# Patient Record
Sex: MALE | Race: BLACK OR AFRICAN AMERICAN | NOT HISPANIC OR LATINO | Employment: FULL TIME | URBAN - METROPOLITAN AREA
[De-identification: names, ages, dates, MRNs, and addresses within clinical notes are randomized per-mention and may not be internally consistent; named-entity substitution may affect disease eponyms.]

---

## 2017-01-24 ENCOUNTER — HOSPITAL ENCOUNTER (OUTPATIENT)
Dept: CARDIOLOGY | Facility: HOSPITAL | Age: 57
Discharge: HOME | End: 2017-01-24
Attending: ORTHOPAEDIC SURGERY | Admitting: ORTHOPAEDIC SURGERY

## 2017-01-24 ENCOUNTER — TRANSCRIBE ORDERS (OUTPATIENT)
Dept: ADMINISTRATIVE | Facility: HOSPITAL | Age: 57
End: 2017-01-24

## 2017-01-24 ENCOUNTER — LAB (OUTPATIENT)
Dept: LAB | Facility: HOSPITAL | Age: 57
End: 2017-01-24
Attending: ORTHOPAEDIC SURGERY

## 2017-01-24 ENCOUNTER — HOSPITAL ENCOUNTER (OUTPATIENT)
Dept: GENERAL RADIOLOGY | Facility: HOSPITAL | Age: 57
Discharge: HOME | End: 2017-01-24
Attending: ORTHOPAEDIC SURGERY | Admitting: ORTHOPAEDIC SURGERY

## 2017-01-24 DIAGNOSIS — Z01.818 PREOP TESTING: ICD-10-CM

## 2017-01-24 DIAGNOSIS — M25.511 RIGHT SHOULDER PAIN, UNSPECIFIED CHRONICITY: ICD-10-CM

## 2017-01-24 DIAGNOSIS — M25.511 RIGHT SHOULDER PAIN, UNSPECIFIED CHRONICITY: Primary | ICD-10-CM

## 2017-01-24 LAB
ANION GAP SERPL CALCULATED.3IONS-SCNC: 11.8 MMOL/L
BUN BLD-MCNC: 13 MG/DL (ref 6–20)
BUN/CREAT SERPL: 11.9 (ref 7–25)
CALCIUM SPEC-SCNC: 9.9 MG/DL (ref 8.6–10.5)
CHLORIDE SERPL-SCNC: 100 MMOL/L (ref 98–107)
CO2 SERPL-SCNC: 29.2 MMOL/L (ref 22–29)
CREAT BLD-MCNC: 1.09 MG/DL (ref 0.76–1.27)
DEPRECATED RDW RBC AUTO: 44.1 FL (ref 37–54)
ERYTHROCYTE [DISTWIDTH] IN BLOOD BY AUTOMATED COUNT: 12.9 % (ref 11.5–14.5)
GFR SERPL CREATININE-BSD FRML MDRD: 85 ML/MIN/1.73
GLUCOSE BLD-MCNC: 108 MG/DL (ref 65–99)
HCT VFR BLD AUTO: 47.9 % (ref 40.4–52.2)
HGB BLD-MCNC: 16.2 G/DL (ref 13.7–17.6)
MCH RBC QN AUTO: 31.8 PG (ref 27–32.7)
MCHC RBC AUTO-ENTMCNC: 33.8 G/DL (ref 32.6–36.4)
MCV RBC AUTO: 93.9 FL (ref 79.8–96.2)
PLATELET # BLD AUTO: 139 10*3/MM3 (ref 140–500)
PMV BLD AUTO: 12.6 FL (ref 6–12)
POTASSIUM BLD-SCNC: 4.2 MMOL/L (ref 3.5–5.2)
RBC # BLD AUTO: 5.1 10*6/MM3 (ref 4.6–6)
SODIUM BLD-SCNC: 141 MMOL/L (ref 136–145)
WBC NRBC COR # BLD: 5.9 10*3/MM3 (ref 4.5–10.7)

## 2017-01-24 PROCEDURE — 80048 BASIC METABOLIC PNL TOTAL CA: CPT

## 2017-01-24 PROCEDURE — 85027 COMPLETE CBC AUTOMATED: CPT

## 2017-01-24 PROCEDURE — 36415 COLL VENOUS BLD VENIPUNCTURE: CPT

## 2017-01-24 PROCEDURE — 93010 ELECTROCARDIOGRAM REPORT: CPT | Performed by: INTERNAL MEDICINE

## 2017-01-24 PROCEDURE — 93005 ELECTROCARDIOGRAM TRACING: CPT

## 2017-02-17 ENCOUNTER — TREATMENT (OUTPATIENT)
Dept: PHYSICAL THERAPY | Facility: CLINIC | Age: 57
End: 2017-02-17

## 2017-02-17 DIAGNOSIS — M25.511 ACUTE PAIN OF RIGHT SHOULDER: Primary | ICD-10-CM

## 2017-02-17 DIAGNOSIS — Z98.890 S/P ARTHROSCOPY OF SHOULDER: ICD-10-CM

## 2017-02-17 PROCEDURE — 97140 MANUAL THERAPY 1/> REGIONS: CPT | Performed by: PHYSICAL THERAPIST

## 2017-02-17 PROCEDURE — 97530 THERAPEUTIC ACTIVITIES: CPT | Performed by: PHYSICAL THERAPIST

## 2017-02-17 PROCEDURE — 97110 THERAPEUTIC EXERCISES: CPT | Performed by: PHYSICAL THERAPIST

## 2017-02-17 PROCEDURE — 97001 PR PHYS THERAPY EVALUATION: CPT | Performed by: PHYSICAL THERAPIST

## 2017-02-17 PROCEDURE — 97014 ELECTRIC STIMULATION THERAPY: CPT | Performed by: PHYSICAL THERAPIST

## 2017-02-17 NOTE — PROGRESS NOTES
"Physical Therapy Initial Evaluation and Plan of Care    TIME IN 1358 TIME OUT 1529  Patient: Pop Franco   : 1960  Diagnosis/ICD-10 Code:  Acute pain of right shoulder [M25.511]  Referring practitioner: KESHA Dobbins    Subjective Evaluation    History of Present Illness  Date of onset: 2016  Date of surgery: 2017  Mechanism of injury: S/p (R) shoulder SLAP (type 2) repair and acromioplasty.  Sling x 2 weeks.  Original DIANA: stepping down out of truck, missed bottom step.  States he had gone back to work one day () and re-aggravated (R) shoulder performing driving activities.  Returned to Dr. Clayton who scheduled patient for surgery.    Quality of life: excellent    Pain  Current pain ratin  At best pain ratin  At worst pain ratin  Location: generalized (R) anterior and superior shoulder  Quality: sharp (\"strong\")  Relieving factors: rest and support (has not tried ice after surgery; is not currently taking any pain medication)  Aggravating factors: movement and overhead activity (sleeping/waking early due to shoulder pain)    Social Support  Lives in: apartment (2nd floor apartment)  Lives with: alone    Hand dominance: right    Diagnostic Tests  MRI studies: normal (6mm (R) SS tear; during surgery determined to be Type 2 SLAP tear)    Treatments  Previous treatment: physical therapy and medication  Patient Goals  Patient goals for therapy: decreased pain, increased motion, increased strength, independence with ADLs/IADLs and return to work  Patient goal: \"get back to work\"           Objective     Static Posture     Shoulders  Depressed and rounded.    Scapulae  Left protracted and right protracted.    Tenderness     Right Shoulder  Tenderness in the acromion, biceps tendon (proximal), bicipital groove and clavicle.     Additional Tenderness Details  Mod. (+) TTP (R) anterior shoulder along GH joint line  Min. (+) TTP (R) lateral upper arm    Active Range of " "Motion   Left Shoulder   Flexion: 157 degrees   Extension: 55 degrees   Abduction: 172 degrees   External rotation 90°: 98 degrees   Internal rotation 0°: 37 degrees     Left Elbow   Flexion: 143 degrees   Extension: 0 degrees     Right Elbow   Flexion: 132 degrees   Extension: 0 (\"a little pain\") degrees with pain    Additional Active Range of Motion Details  (R) shoulder  AROM not yet assessed due to post-op status.    Passive Range of Motion     Right Shoulder   Flexion: 116 degrees with pain  Abduction: Right shoulder passive abduction: Not tested due to recent post-op status.   External rotation 0°: 50 (\"a little sore\") degrees     Strength/Myotome Testing     Left Shoulder   Normal muscle strength    Additional Strength Details  (R) shoulder and elbow strength not formally assessed due to recent post-op status         Assessment & Plan     Assessment  Impairments: abnormal or restricted ROM, activity intolerance, impaired physical strength, lacks appropriate home exercise program and pain with function  Assessment details: SHORT TERM GOALS:     4 weeks  1. Pt I w/ initial HEP  2. Pt to demonstrate PROM of the right shoulder to WFL to improve ability to perform ADL's  3. Pt to demonstrate ability to perform 30 minutes continuous activity in the clinic without increase in (R) shoulder pain   4. Tolerate 10 reps each of 5-way shoulder isometrics in neutral without increased s/s  5. Report ability to sleep 4-5 hours uninterrupted by pain    LONG TERM GOALS:   8 weeks  1. Pt to demonstrate AROM of the right shoulder to WNL to allow ability to perform all necessary functional activities  2. Pt to demonstrate ability to lift 40# floor-->waist-->shoulder and 15-20# OH without increase in pain  3. Pt to report normalized driving ability, painfree  4. Pt to tolerate 60 minutes continuous activity in the clinic without increase in pain   5. Report 75-80% return to normal functional ability  6. Demo readiness to RTW " "without restrictions    Prognosis: good    Goals  \"get back to work\"    Plan  Therapy options: will be seen for skilled physical therapy services  Planned modality interventions: cryotherapy, electrical stimulation/Sl[place] stimulation, ultrasound and iontophoresis  Planned therapy interventions: fine motor coordination training, flexibility, functional ROM exercises, home exercise program, joint mobilization, manual therapy, soft tissue mobilization, strengthening, stretching and therapeutic activities  Frequency: 3x week  Duration in weeks: 8        Manual Therapy:    15     mins  97269;  Therapeutic Exercise:    16     mins  46135;     Neuromuscular Rachid:        mins  53176;    Therapeutic Activity:     12     mins  00054;     Gait Training:           mins  32801;     Ultrasound:          mins  82219;    Electrical Stimulation:    15     mins  49928 ( );  Dry Needling          mins self-pay    Timed Treatment:   43   mins   Total Treatment:     91   mins    PT SIGNATURE: Krystle Aguilar PT, DPT   DATE TREATMENT INITIATED: 2/17/2017    Initial Certification  Certification Period: 5/18/2017  I certify that the therapy services are furnished while this patient is under my care.  The services outlined above are required by this patient, and will be reviewed every 90 days.     PHYSICIAN: Joshua Seals, APRN      DATE:     Please sign and return via fax to [phone].. Thank you, [facility].        "

## 2017-02-20 ENCOUNTER — TREATMENT (OUTPATIENT)
Dept: PHYSICAL THERAPY | Facility: CLINIC | Age: 57
End: 2017-02-20

## 2017-02-20 DIAGNOSIS — Z98.890 S/P ARTHROSCOPY OF SHOULDER: ICD-10-CM

## 2017-02-20 DIAGNOSIS — M25.511 ACUTE PAIN OF RIGHT SHOULDER: Primary | ICD-10-CM

## 2017-02-20 DIAGNOSIS — Z98.890 STATUS POST LABRAL REPAIR OF SHOULDER: ICD-10-CM

## 2017-02-20 PROCEDURE — 97530 THERAPEUTIC ACTIVITIES: CPT | Performed by: PHYSICAL THERAPIST

## 2017-02-20 PROCEDURE — 97140 MANUAL THERAPY 1/> REGIONS: CPT | Performed by: PHYSICAL THERAPIST

## 2017-02-20 PROCEDURE — 97110 THERAPEUTIC EXERCISES: CPT | Performed by: PHYSICAL THERAPIST

## 2017-02-20 PROCEDURE — 97014 ELECTRIC STIMULATION THERAPY: CPT | Performed by: PHYSICAL THERAPIST

## 2017-02-20 NOTE — PROGRESS NOTES
"Physical Therapy Daily Progress Note    Time In 1258  Time Out 1400    Pop Franco reports: \"I guess my shoulder is about the same.\"  When questioned, patient responds that he has been doing his stretches/exercises over the weekend.     Subjective Evaluation    Pain  Current pain ratin (with stretching/exercise; 5/10 at rest)           Objective   See Exercise, Manual, and Modality Logs for complete treatment.   *Added Swiss ball flexion and abduction on low table and scapular retractions/adduction.    Assessment & Plan     Assessment  Assessment details: Patient had reported at first visit that (R) shoulder was painful going into stretches and coming out of them (eccentric), but this date pain is primarily once position of stretch is reached.  He is compliant with all activities during PT as asked but does report that most are painful.  He tolerates manual activities well but has moderate difficulty fully relaxing (R) UE/moderate muscle guarding persists. He expresses positive response to electrical stimulation and ice modalities.          Progress per Plan of Care with emphasis on ROM activities.         Manual Therapy:    15     mins  83269;  Therapeutic Exercise:    21     mins  40140;     Neuromuscular Rachid:        mins  41279;    Therapeutic Activity:     11     mins  64207;     Gait Training:           mins  72653;     Ultrasound:          mins  46468;    Electrical Stimulation:    15     mins  80484 ( );  Dry Needling          mins self-pay    Timed Treatment:   47   mins   Total Treatment:     62   mins    Krystle Aguilar PT, DPT  Physical Therapist  [place] License # [id number]    "

## 2017-02-21 ENCOUNTER — TREATMENT (OUTPATIENT)
Dept: PHYSICAL THERAPY | Facility: CLINIC | Age: 57
End: 2017-02-21

## 2017-02-21 DIAGNOSIS — M25.511 ACUTE PAIN OF RIGHT SHOULDER: Primary | ICD-10-CM

## 2017-02-21 DIAGNOSIS — Z98.890 STATUS POST LABRAL REPAIR OF SHOULDER: ICD-10-CM

## 2017-02-21 DIAGNOSIS — Z98.890 S/P ARTHROSCOPY OF SHOULDER: ICD-10-CM

## 2017-02-21 PROCEDURE — 97014 ELECTRIC STIMULATION THERAPY: CPT | Performed by: PHYSICAL THERAPIST

## 2017-02-21 PROCEDURE — 97140 MANUAL THERAPY 1/> REGIONS: CPT | Performed by: PHYSICAL THERAPIST

## 2017-02-21 PROCEDURE — 97110 THERAPEUTIC EXERCISES: CPT | Performed by: PHYSICAL THERAPIST

## 2017-02-21 NOTE — PROGRESS NOTES
"Physical Therapy Daily Progress Note    Time In 1336  Time Out 1441    Pop Franco reports: \"My shoulder is about the same I guess.\" When questioned, patient responds that he is not taking any pain medication and that he does not want to have to take anything.    Subjective Evaluation    Pain  Current pain ratin  At worst pain ratin (with stretching/exercise/activity)           Objective   See Exercise, Manual, and Modality Logs for complete treatment.   *Added 3-way shoulder submaximal isometrics (flexion, abduction, extension)    Assessment & Plan     Assessment  Assessment details: Slightly improved (R) shoulder PROM arcs available this date compared to previous session, and subjective c/o decrease as patient is able to increase relaxation during manual efforts. Patient is able to complete all activities with minimal cueing to correct and/or improve technique.  Patient states he is tolerating all therapy without taking any pain medication (prescription or OTC).        Progress per Plan of Care. Reassess (R) shoulder PROM.         Manual Therapy:    15     mins  95433;  Therapeutic Exercise:    35     mins  61652;     Neuromuscular Rachid:        mins  84710;    Therapeutic Activity:          mins  02575;     Gait Training:           mins  64804;     Ultrasound:          mins  56967;    Electrical Stimulation:    15     mins  11462 ( );  Dry Needling          mins self-pay    Timed Treatment:   50   mins   Total Treatment:     65   mins    Krystle Aguilar PT, DPT  Physical Therapist  [place] License # [id number]    "

## 2017-02-22 ENCOUNTER — TREATMENT (OUTPATIENT)
Dept: PHYSICAL THERAPY | Facility: CLINIC | Age: 57
End: 2017-02-22

## 2017-02-22 DIAGNOSIS — Z98.890 S/P ARTHROSCOPY OF SHOULDER: ICD-10-CM

## 2017-02-22 DIAGNOSIS — M25.511 ACUTE PAIN OF RIGHT SHOULDER: Primary | ICD-10-CM

## 2017-02-22 DIAGNOSIS — Z98.890 STATUS POST LABRAL REPAIR OF SHOULDER: ICD-10-CM

## 2017-02-22 PROCEDURE — 97140 MANUAL THERAPY 1/> REGIONS: CPT | Performed by: PHYSICAL THERAPIST

## 2017-02-22 PROCEDURE — 97110 THERAPEUTIC EXERCISES: CPT | Performed by: PHYSICAL THERAPIST

## 2017-02-22 PROCEDURE — 97014 ELECTRIC STIMULATION THERAPY: CPT | Performed by: PHYSICAL THERAPIST

## 2017-02-22 NOTE — PROGRESS NOTES
"Physical Therapy Daily Progress Note    Time In 1105  Time Out 1214    Pop Franco reports: \"My shoulder might feel just a little bit better. The shoulder pain makes it harder to fall asleep but it only woke me up once last night.\"    Subjective     Objective     Passive Range of Motion     Right Shoulder   Flexion: 132 degrees with pain  External rotation 0°: 58 degrees with pain     See Exercise, Manual, and Modality Logs for complete treatment.       Assessment & Plan     Assessment  Assessment details: Patient demonstrates improved (R) shoulder PROM in planes of flexion and ER (from neutral abduction), both pain-limited.  Patient requires increased time to complete all activities as he performs them slowly and cautiously, but completes all activities as asked.  Encouraged more frequent use of ice at home and patient verbalized understanding.        Progress per Plan of Care         Manual Therapy:    15     mins  32231;  Therapeutic Exercise:    33     mins  88310;     Neuromuscular Rachid:        mins  31622;    Therapeutic Activity:          mins  11494;     Gait Training:           mins  23567;     Ultrasound:          mins  92543;    Electrical Stimulation:    15     mins  83891 ( );  Dry Needling          mins self-pay    Timed Treatment:   48   mins   Total Treatment:     69   mins    Krystle Aguilar PT, DPT  Physical Therapist  [place] License # [id number]    "

## 2017-02-27 ENCOUNTER — TREATMENT (OUTPATIENT)
Dept: PHYSICAL THERAPY | Facility: CLINIC | Age: 57
End: 2017-02-27

## 2017-02-27 DIAGNOSIS — Z98.890 STATUS POST LABRAL REPAIR OF SHOULDER: ICD-10-CM

## 2017-02-27 DIAGNOSIS — Z98.890 S/P ARTHROSCOPY OF SHOULDER: ICD-10-CM

## 2017-02-27 DIAGNOSIS — M25.511 ACUTE PAIN OF RIGHT SHOULDER: Primary | ICD-10-CM

## 2017-02-27 PROCEDURE — 97014 ELECTRIC STIMULATION THERAPY: CPT | Performed by: PHYSICAL THERAPIST

## 2017-02-27 PROCEDURE — 97110 THERAPEUTIC EXERCISES: CPT | Performed by: PHYSICAL THERAPIST

## 2017-02-27 PROCEDURE — 97140 MANUAL THERAPY 1/> REGIONS: CPT | Performed by: PHYSICAL THERAPIST

## 2017-02-27 NOTE — PROGRESS NOTES
"Physical Therapy Daily Progress Note    Time In 1304  Time Out 1433    Pop Franco reports: \"My shoulder is feeling a little bit better today.\"    Subjective     Objective   See Exercise, Manual, and Modality Logs for complete treatment.   *Increased repetitions of submax isometrics.    Assessment & Plan     Assessment  Assessment details: Patient requires significantly increased time to perform/complete all activities, and at times appears to nearly be falling asleep during exercise/stretching performance.  Patient expresses increased pain with submaximal isometric activity this date compared to prior.  He tolerates PROM/manual therapy efforts fairly well this date after initial several repetitions which are moderately limited by pain.         Progress per Plan of Care         Manual Therapy:    15     mins  33711;  Therapeutic Exercise:    33     mins  58584;     Neuromuscular Rachid:        mins  87740;    Therapeutic Activity:          mins  63973;     Gait Training:           mins  71681;     Ultrasound:          mins  68962;    Electrical Stimulation:    15     mins  02341 ( );  Dry Needling          mins self-pay    Timed Treatment:   48   mins   Total Treatment:     89   mins    Krystle Aguilar PT, DPT  Physical Therapist  [place] License # [id number]    "

## 2017-03-01 ENCOUNTER — TREATMENT (OUTPATIENT)
Dept: PHYSICAL THERAPY | Facility: CLINIC | Age: 57
End: 2017-03-01

## 2017-03-01 DIAGNOSIS — Z98.890 S/P ARTHROSCOPY OF SHOULDER: ICD-10-CM

## 2017-03-01 DIAGNOSIS — M25.511 ACUTE PAIN OF RIGHT SHOULDER: Primary | ICD-10-CM

## 2017-03-01 DIAGNOSIS — Z98.890 STATUS POST LABRAL REPAIR OF SHOULDER: ICD-10-CM

## 2017-03-01 PROCEDURE — 97110 THERAPEUTIC EXERCISES: CPT | Performed by: PHYSICAL THERAPIST

## 2017-03-01 PROCEDURE — 97014 ELECTRIC STIMULATION THERAPY: CPT | Performed by: PHYSICAL THERAPIST

## 2017-03-01 PROCEDURE — 97140 MANUAL THERAPY 1/> REGIONS: CPT | Performed by: PHYSICAL THERAPIST

## 2017-03-01 NOTE — PROGRESS NOTES
"Physical Therapy Daily Progress Note    Time In 1107  Time Out 1211    Pop Franco reports: \"I'm doing all right I guess. Today after about 10 repetitions of the [shoulderblade squeezes] I started having some pain at the front of my shoulder when I came back to the start position. Those usually aren't painful. I slept pretty good last night -- my shoulder only woke me up a couple times; once because I rolled over onto my shoulder.\"     Subjective     Objective   See Exercise, Manual, and Modality Logs for complete treatment.   *Increased repetitions of most stretching/AAROM exercises.    Assessment & Plan     Assessment  Assessment details: Patient is compliant with PT interventions and performs all activities as asked, though with significantly increased time to perform.  PROM/manual efforts are pain-limited in planes of flexion and external rotation, though patient does not appear to experience the sharpness with initial repetitions.  Patient verbalizes compliance with HEP.        Progress per Plan of Care - ROM and gentle strengthening activities.  Consider addition of isometric IR and ER.  Reassess (R) shoulder PROM.         Manual Therapy:    15     mins  51984;  Therapeutic Exercise:    34     mins  31918;     Neuromuscular Rachid:        mins  90476;    Therapeutic Activity:          mins  77301;     Gait Training:           mins  58614;     Ultrasound:          mins  16870;    Electrical Stimulation:    15     mins  69282 ( );  Dry Needling          mins self-pay    Timed Treatment:   49   mins   Total Treatment:     64   mins    Krystle Aguilar PT, DPT  Physical Therapist  [place] License # [id number]    "

## 2017-03-03 ENCOUNTER — TREATMENT (OUTPATIENT)
Dept: PHYSICAL THERAPY | Facility: CLINIC | Age: 57
End: 2017-03-03

## 2017-03-03 DIAGNOSIS — Z98.890 STATUS POST LABRAL REPAIR OF SHOULDER: ICD-10-CM

## 2017-03-03 DIAGNOSIS — Z98.890 S/P ARTHROSCOPY OF SHOULDER: ICD-10-CM

## 2017-03-03 DIAGNOSIS — M25.511 ACUTE PAIN OF RIGHT SHOULDER: Primary | ICD-10-CM

## 2017-03-03 PROCEDURE — 97140 MANUAL THERAPY 1/> REGIONS: CPT | Performed by: PHYSICAL THERAPIST

## 2017-03-03 PROCEDURE — 97110 THERAPEUTIC EXERCISES: CPT | Performed by: PHYSICAL THERAPIST

## 2017-03-03 PROCEDURE — 97014 ELECTRIC STIMULATION THERAPY: CPT | Performed by: PHYSICAL THERAPIST

## 2017-03-03 NOTE — PROGRESS NOTES
"Physical Therapy Daily Progress Note    Time In 1104  Time Out 1211    Pop Franco reports: \"My shoulder wasn't really bothering me until I came in here and started stretching it.  I actually slept good last night; it was the first night since surgery I slept all night without my shoulder waking me.\"    Subjective     Objective     Passive Range of Motion     Right Shoulder   Flexion: 145 degrees   External rotation 45°: 60 (from 30 deg abduction) degrees with pain     See Exercise, Manual, and Modality Logs for complete treatment.   *Added isometric shoulder IR/ER and bent over row    Assessment & Plan     Assessment  Assessment details: Patient demonstrates improved (R) shoulder PROM in planes of flexion and ER (from 30 deg abduction), though both are pain-limited.  He is able to initiate bent over row though cannot tolerate (R) upper arm extending beyond trunk, so patient was cued for performance within painfree ROM.  He reports first night of sleep uninterrupted by pain since surgery. He also expresses motivation to be released to return to work, but was cautioned that he does not demonstrate sufficient strength as yet and risk of re-injury would be high.         Progress per Plan of Care       Manual Therapy:    15     mins  24296;  Therapeutic Exercise:    37     mins  39479;     Neuromuscular Rachid:        mins  63246;    Therapeutic Activity:          mins  66028;     Gait Training:           mins  82163;     Ultrasound:          mins  77277;    Electrical Stimulation:    15     mins  40545 ( );  Dry Needling          mins self-pay    Timed Treatment:   52   mins   Total Treatment:     67   mins    Krystle Aguilar PT, DPT  Physical Therapist  [place] License # [id number]    "

## 2017-03-08 ENCOUNTER — TREATMENT (OUTPATIENT)
Dept: PHYSICAL THERAPY | Facility: CLINIC | Age: 57
End: 2017-03-08

## 2017-03-08 DIAGNOSIS — M25.511 ACUTE PAIN OF RIGHT SHOULDER: Primary | ICD-10-CM

## 2017-03-08 DIAGNOSIS — Z98.890 STATUS POST LABRAL REPAIR OF SHOULDER: ICD-10-CM

## 2017-03-08 DIAGNOSIS — Z98.890 S/P ARTHROSCOPY OF SHOULDER: ICD-10-CM

## 2017-03-08 PROCEDURE — 97110 THERAPEUTIC EXERCISES: CPT | Performed by: PHYSICAL THERAPIST

## 2017-03-08 PROCEDURE — 97140 MANUAL THERAPY 1/> REGIONS: CPT | Performed by: PHYSICAL THERAPIST

## 2017-03-08 PROCEDURE — 97014 ELECTRIC STIMULATION THERAPY: CPT | Performed by: PHYSICAL THERAPIST

## 2017-03-08 NOTE — PROGRESS NOTES
"Physical Therapy Daily Progress Note    Time In 1103  Time Out 1204    Pop Franco reports: \"I am hurting more than usual today.  I didn't think it would really bother me so I took a day trip to [place] [left Monday morning, returned Tuesday around noon] and I didn't do anything strenuous but by the time I got back home I was really hurting.\"    Subjective Evaluation    Pain  Current pain ratin (R) shoulder at rest           Objective   See Exercise, Manual, and Modality Logs for complete treatment.   *Progressed scapular retractions to resisted red band saws  *Added resisted shoulder extension vs band     Assessment & Plan     Assessment  Assessment details: Patient is able to initiate resisted band activities this date, but generally c/o increased s/s with all exercises and stretching performed this date.  Pain at rest is reportedly higher this date than in recent sessions.  PROM manual efforts are limited by pain prior to capsular tightness or restriction, but available ROM arcs persist with steady improvement.        Progress strengthening /stabilization /functional activity. Update POC.         Manual Therapy:    12     mins  35650;  Therapeutic Exercise:    34     mins  00739;     Neuromuscular Rachid:        mins  75776;    Therapeutic Activity:          mins  58973;     Gait Training:           mins  98910;     Ultrasound:          mins  11552;    Electrical Stimulation:    15     mins  84166 ( );  Dry Needling          mins self-pay    Timed Treatment:   46   mins   Total Treatment:     61   mins    Krystle Aguilar PT, DPT  Physical Therapist  [place] License # [id number]    "

## 2017-03-09 ENCOUNTER — OFFICE VISIT (OUTPATIENT)
Dept: PHYSICAL THERAPY | Facility: CLINIC | Age: 57
End: 2017-03-09

## 2017-03-09 DIAGNOSIS — Z98.890 STATUS POST LABRAL REPAIR OF SHOULDER: ICD-10-CM

## 2017-03-09 DIAGNOSIS — M25.511 ACUTE PAIN OF RIGHT SHOULDER: Primary | ICD-10-CM

## 2017-03-09 DIAGNOSIS — M75.101 TEAR OF RIGHT ROTATOR CUFF, UNSPECIFIED TEAR EXTENT: ICD-10-CM

## 2017-03-09 DIAGNOSIS — Z98.890 S/P ARTHROSCOPY OF SHOULDER: ICD-10-CM

## 2017-03-09 PROCEDURE — 97140 MANUAL THERAPY 1/> REGIONS: CPT | Performed by: PHYSICAL THERAPIST

## 2017-03-09 PROCEDURE — 97014 ELECTRIC STIMULATION THERAPY: CPT | Performed by: PHYSICAL THERAPIST

## 2017-03-09 PROCEDURE — 97110 THERAPEUTIC EXERCISES: CPT | Performed by: PHYSICAL THERAPIST

## 2017-03-09 NOTE — PROGRESS NOTES
Physical Therapy Daily Progress Note    Time In 1100  Time Out 1235    Pop Franco reports: right shoulder a little sore today, feels he may have slept on it wrong last night.    Subjective     Objective   See Exercise, Manual, and Modality Logs for complete treatment.   Benefits from verbal/tactile cues to ensure proper posture/technique and exercise progression.    Assessment/Plan Benefits from manual techniques(PROM and stretching) to right shoulder to improve ROM and glenohumeral mobility.  Progressing well with exercise regimen but benefits from verbal/tactile cues to ensure proper technique and performance as well as understanding importance of progression to maximize strength and stability.    Progress per Plan of Care toward all goals.  Continue with manual therapy techniques and strengthening exercises.           Manual Therapy:   15     mins  76681;  Therapeutic Exercise:    35     mins  76571;     Neuromuscular Rachid:      mins  37380;    Therapeutic Activity:     5     mins  79264;     Gait Training:          mins  93597;     Ultrasound:          mins  53244;    Electrical Stimulation:   15     mins  40844 ( );      Timed Treatment:  70    mins   Total Treatment:     95  mins    Darrion Gomez, PTA    Physical Therapist Assistant [place] 117[address]

## 2017-03-10 ENCOUNTER — TREATMENT (OUTPATIENT)
Dept: PHYSICAL THERAPY | Facility: CLINIC | Age: 57
End: 2017-03-10

## 2017-03-10 DIAGNOSIS — Z98.890 STATUS POST LABRAL REPAIR OF SHOULDER: ICD-10-CM

## 2017-03-10 DIAGNOSIS — Z98.890 S/P ARTHROSCOPY OF SHOULDER: ICD-10-CM

## 2017-03-10 DIAGNOSIS — M25.511 ACUTE PAIN OF RIGHT SHOULDER: Primary | ICD-10-CM

## 2017-03-10 PROCEDURE — 97140 MANUAL THERAPY 1/> REGIONS: CPT | Performed by: PHYSICAL THERAPIST

## 2017-03-10 PROCEDURE — 97110 THERAPEUTIC EXERCISES: CPT | Performed by: PHYSICAL THERAPIST

## 2017-03-10 PROCEDURE — 97014 ELECTRIC STIMULATION THERAPY: CPT | Performed by: PHYSICAL THERAPIST

## 2017-03-10 PROCEDURE — 97530 THERAPEUTIC ACTIVITIES: CPT | Performed by: PHYSICAL THERAPIST

## 2017-03-10 NOTE — PROGRESS NOTES
"Physical Therapy Daily Progress Note    Time In 1108  Time Out 1217    Popshane Franco reports: \"Certain things are getting better, like certain exercises are getting easier.  Most nights my shoulder will still wake me up one or two times at night but the last couple of nights I have slept pretty well.  I don't really do a whole lot at home but I guess it has been feeling a little better this week. It still hurts when I reach across my body or reaching behind me to take my wallet out of my pocket. I don't have any trouble reaching up into cabinets.  It still bothers me some when I lift a bottle of detergent or basket of clothes.\"    Subjective Evaluation    Pain  Current pain rating: 3 (with light stretching/ROM activities)  At best pain ratin  At worst pain ratin  Location: (R) lateral upper arm           Objective     Tenderness     Right Shoulder  Tenderness in the acromion, biceps tendon (proximal) and subacromial bursa.     Additional Tenderness Details  Moderate diffuse (+) TTP (R) anterior, superior, and lateral shoulder region.    Active Range of Motion     Right Shoulder   Flexion: 121 (eccentric > concentric pain) degrees with pain  Extension: 49 degrees with pain  Abduction: 123 (eccentric > concentric pain) degrees with pain  External rotation 90°: 63 degreeswith pain  Internal rotation 90°: 12 degrees with pain    Passive Range of Motion     Right Shoulder   Flexion: 163 degrees with pain  Abduction: 159 (\"a little naggin g pain\") degrees with pain  External rotation 45°: 60 degrees with pain  Internal rotation 45°: 63 degrees with pain    Strength/Myotome Testing     Right Shoulder     Planes of Motion   Flexion: 4- (with pain)   Extension: 4   Abduction: 3+ (with pain)   External rotation at 45°: 4-   Internal rotation at 0°: 4     Right Elbow   Flexion: 4 (with pain)  Extension: 4    Left Wrist/Hand      (2nd hand position)     Trial 1: 86    Trial 2: 81    Trial 3: 84    Average: " 83.67    Right Wrist/Hand      (2nd hand position)     Trial 1: 99    Trial 2: 104    Trial 3: 111    Average: 104.67     See Exercise, Manual, and Modality Logs for complete treatment.       Assessment & Plan     Assessment  Impairments: abnormal or restricted ROM, activity intolerance, impaired physical strength, lacks appropriate home exercise program and pain with function  Other impairment: Patient is currently unable to lift and perform UE strength tasks required by regular job.  Assessment details: Patient has demonstrated steady, gradual improvements with PT post-operatively.  (R) shoulder PROM is improved though not yet full.  (R) shoulder AROM and strength are improving though moderately limited as well.  He has some pain with ADLs and is not yet capable of lifting/carrying tasks required by his full duty job, so he will benefit to continue skilled PT services at this time.  He is progressing toward all goals.    SHORT TERM GOALS: 2 weeks  1. Pt I w/ initial HEP - PARTIALLY MET  2. Pt to demonstrate PROM of the right shoulder to WFL to improve ability to perform ADL's - NOT MET  3. Pt to demonstrate ability to perform 30 minutes continuous activity in the clinic without increase in (R) shoulder pain - NOT MET  4. Tolerate 10 reps each of 5-way shoulder isometrics in neutral without increased s/s - PARTIALLY MET  5. Report ability to sleep 4-5 hours uninterrupted by pain - PARTIALLY MET    LONG TERM GOALS: 4 weeks  1. Pt to demonstrate AROM of the right shoulder to WNL to allow ability to perform all necessary functional activities - NOT MET  2. Pt to demonstrate ability to lift 40# floor-->waist-->shoulder and 15-20# OH without increase in pain - NOT MET  3. Pt to report normalized driving ability, painfree - NOT MET  4. Pt to tolerate 60 minutes continuous activity in the clinic without increase in pain - NOT MET  5. Report 75-80% return to normal functional ability - NOT MET  6. Demo readiness to RTW  without restrictions - NOT MET        Other - to MD 03/14/17 with letter.         Manual Therapy:    12     mins  90348;  Therapeutic Exercise:    21     mins  34903;     Neuromuscular Rachid:        mins  34116;    Therapeutic Activity:     16     mins  43077;     Gait Training:           mins  07808;     Ultrasound:          mins  24283;    Electrical Stimulation:    15     mins  90917 ( );  Dry Needling          mins self-pay    Timed Treatment:   48   mins   Total Treatment:     69   mins    Krystle Aguilar PT, DPT  Physical Therapist  [place] License # [id number]

## 2017-03-15 ENCOUNTER — TREATMENT (OUTPATIENT)
Dept: PHYSICAL THERAPY | Facility: CLINIC | Age: 57
End: 2017-03-15

## 2017-03-15 DIAGNOSIS — M25.511 ACUTE PAIN OF RIGHT SHOULDER: Primary | ICD-10-CM

## 2017-03-15 DIAGNOSIS — Z98.890 STATUS POST LABRAL REPAIR OF SHOULDER: ICD-10-CM

## 2017-03-15 DIAGNOSIS — Z98.890 S/P ARTHROSCOPY OF SHOULDER: ICD-10-CM

## 2017-03-15 PROCEDURE — 97110 THERAPEUTIC EXERCISES: CPT | Performed by: PHYSICAL THERAPIST

## 2017-03-15 PROCEDURE — 97530 THERAPEUTIC ACTIVITIES: CPT | Performed by: PHYSICAL THERAPIST

## 2017-03-15 PROCEDURE — 97014 ELECTRIC STIMULATION THERAPY: CPT | Performed by: PHYSICAL THERAPIST

## 2017-03-15 PROCEDURE — 97140 MANUAL THERAPY 1/> REGIONS: CPT | Performed by: PHYSICAL THERAPIST

## 2017-03-15 NOTE — PROGRESS NOTES
"Physical Therapy Daily Progress Note    Time In 1030  Time Out 1147    Pop Franco reports: \"My shoulder doesn't feel too good today. I rolled over onto my (R) arm this morning and it's been bothering me more than usual since then.\"     Subjective     Objective   See Exercise, Manual, and Modality Logs for complete treatment.   *Added resisted LPDs  *Added sidelying ER    Assessment & Plan     Assessment  Assessment details: Patient reports increased s/s this date which he attributes to rolling onto (R) shoulder during sleep. He performs all activities without significantly increased s/s this date, including addition of resisted lat pulls and sidelying ER.  He verbalizes compliance with HEP and has decent recall of therapeutic exercises with occasional cueing beneficial for improved technique for optimal benefit.        Progress per Plan of Care -consider addition of IR stretching. Reassess PROM and AROM (R) shoulder.          Manual Therapy:    12     mins  41656;  Therapeutic Exercise:    21     mins  30263;     Neuromuscular Rachid:        mins  03555;    Therapeutic Activity:     13    mins  94792;     Gait Training:           mins  39078;     Ultrasound:          mins  47457;    Electrical Stimulation:    15     mins  69776 ( );  Dry Needling          mins self-pay    Timed Treatment:   46   mins   Total Treatment:     77   mins    Krystle Aguilar PT, DPT  Physical Therapist  [place] License # [id number]    "

## 2017-03-17 ENCOUNTER — TREATMENT (OUTPATIENT)
Dept: PHYSICAL THERAPY | Facility: CLINIC | Age: 57
End: 2017-03-17

## 2017-03-17 DIAGNOSIS — M25.511 ACUTE PAIN OF RIGHT SHOULDER: Primary | ICD-10-CM

## 2017-03-17 DIAGNOSIS — Z98.890 S/P ARTHROSCOPY OF SHOULDER: ICD-10-CM

## 2017-03-17 DIAGNOSIS — Z98.890 STATUS POST LABRAL REPAIR OF SHOULDER: ICD-10-CM

## 2017-03-17 PROCEDURE — 97530 THERAPEUTIC ACTIVITIES: CPT | Performed by: PHYSICAL THERAPIST

## 2017-03-17 PROCEDURE — 97014 ELECTRIC STIMULATION THERAPY: CPT | Performed by: PHYSICAL THERAPIST

## 2017-03-17 PROCEDURE — 97140 MANUAL THERAPY 1/> REGIONS: CPT | Performed by: PHYSICAL THERAPIST

## 2017-03-17 PROCEDURE — 97110 THERAPEUTIC EXERCISES: CPT | Performed by: PHYSICAL THERAPIST

## 2017-03-17 NOTE — PROGRESS NOTES
"Physical Therapy Daily Progress Note    Time In 1105  Time Out 1209    Pop Franco reports: \"My shoulder really wasn't bothering me too bad until I did those stretches behind my back [IR stretches with cane].\"     Subjective     Objective     Active Range of Motion     Right Shoulder   Flexion: 131 degrees with pain  Abduction: 67 (pain with abd > flexion) degrees with pain     See Exercise, Manual, and Modality Logs for complete treatment.   *Added cane IR stretch and 2-way (R) shoulder AROM (flexion, scaption)    Assessment & Plan     Assessment  Assessment details: Patient persists with significant lethargy and apparent drowsiness during session which is not uncommon.  He requires cueing for attention to task as well as staying focused to complete repetitions as asked.  He requires increased time to perform all activities compared to what is expected.  He is able to initiate (R) shoulder AROM activities this date but reports significant pain with measuring (R) shoulder abduction, which is also limited significantly limited.        Progress per Plan of Care. Reassess PROM (R) shoulder.         Manual Therapy:    12     mins  82627;  Therapeutic Exercise:    21    mins  12537;     Neuromuscular Rachid:        mins  08200;    Therapeutic Activity:     16    mins  51935;     Gait Training:           mins  37792;     Ultrasound:          mins  59455;    Electrical Stimulation:    15     mins  33240 ( );  Dry Needling          mins self-pay    Timed Treatment:   49   mins   Total Treatment:     64   mins    Krystle Aguilar PT, DPT  Physical Therapist  [place] License # [id number]    "

## 2017-03-20 ENCOUNTER — OFFICE VISIT (OUTPATIENT)
Dept: PHYSICAL THERAPY | Facility: CLINIC | Age: 57
End: 2017-03-20

## 2017-03-20 DIAGNOSIS — M75.101 TEAR OF RIGHT ROTATOR CUFF, UNSPECIFIED TEAR EXTENT: ICD-10-CM

## 2017-03-20 DIAGNOSIS — Z98.890 STATUS POST LABRAL REPAIR OF SHOULDER: ICD-10-CM

## 2017-03-20 DIAGNOSIS — Z98.890 S/P ARTHROSCOPY OF SHOULDER: Primary | ICD-10-CM

## 2017-03-20 PROCEDURE — 97140 MANUAL THERAPY 1/> REGIONS: CPT | Performed by: PHYSICAL THERAPIST

## 2017-03-20 PROCEDURE — 97110 THERAPEUTIC EXERCISES: CPT | Performed by: PHYSICAL THERAPIST

## 2017-03-20 PROCEDURE — 97530 THERAPEUTIC ACTIVITIES: CPT | Performed by: PHYSICAL THERAPIST

## 2017-03-20 NOTE — PROGRESS NOTES
"Physical Therapy Daily Progress Note    Time In 1100  Time Out 1220    Pop Franco reports: right shoulder sore..  Feels like it \"pops'' more now.  Still can't get arm behind by back very well.    Subjective     Objective   See Exercise, Manual, and Modality Logs for complete treatment.   Changed IR cane stretch from behind back and over to buttock to behind back with upward pull.  Added red t band IR/ER activities right shoulder.    Benefits from verbal/tactile cues to ensure correct exercise technique, repetition performance and scapular positioning.      Assessment/Plan  Increase in right shoulder subjective complaints of discomfort this session post exercise/activtiy performance.  Remain limited in IR planes right shoulder with PROM and PROM stretching with noted capsular tightness, mm tightness and guarding.  Modality application remains beneficial for pain control.    Progress strengthening /stabilization /functional activity as symptoms allow.  Continue with manual efforts to increase PROM and GH joint mobility.           Manual Therapy:    15     mins  03737;  Therapeutic Exercise:    30     mins  43896;     Neuromuscular Rachid:        mins  57040;    Therapeutic Activity:     15     mins  79692;     Gait Training:           mins  85651;     Ultrasound:          mins  74126;    Electrical Stimulation:    15     mins  51325 ( );      Timed Treatment:   75   mins   Total Treatment:     80 mins    Darrion Gomez PTA    Physical Therapist Assistant [place] [id number]    "

## 2017-03-22 ENCOUNTER — TREATMENT (OUTPATIENT)
Dept: PHYSICAL THERAPY | Facility: CLINIC | Age: 57
End: 2017-03-22

## 2017-03-22 DIAGNOSIS — Z98.890 STATUS POST LABRAL REPAIR OF SHOULDER: ICD-10-CM

## 2017-03-22 DIAGNOSIS — Z98.890 S/P ARTHROSCOPY OF SHOULDER: Primary | ICD-10-CM

## 2017-03-22 PROCEDURE — 97140 MANUAL THERAPY 1/> REGIONS: CPT | Performed by: PHYSICAL THERAPIST

## 2017-03-22 PROCEDURE — 97110 THERAPEUTIC EXERCISES: CPT | Performed by: PHYSICAL THERAPIST

## 2017-03-22 PROCEDURE — 97530 THERAPEUTIC ACTIVITIES: CPT | Performed by: PHYSICAL THERAPIST

## 2017-03-22 NOTE — PROGRESS NOTES
"Physical Therapy Daily Progress Note    Time In 1104  Time Out 1227    Pop Franco reports: \"I'm doing all right this morning I guess. My shoulder isn't feeling too great -- that other therapist really did a number on me the other day.  I'm still sore from that.\"    Subjective     Objective     Passive Range of Motion     Right Shoulder   Flexion: 162 (moderate capsular end feel) degrees   Abduction: 156 (moderate capsular end feel) degrees   External rotation 90°: 44 degrees with pain  Internal rotation 90°: 51 degrees with pain     See Exercise, Manual, and Modality Logs for complete treatment.   *Added shoulder abd AAROM with pulley  *Added resisted IR/ER vs red band  *Added 2# weight to bent over rows  *Increased repetitions of sidelying (R) shoulder ER    Assessment & Plan     Assessment  Assessment details: Patient expresses pain and popping with initial 2-3 repetitions of resisted (R) shld IR vs red band, despite increasing slack in red band.  Popping decreased to occasional frequency after initial few repetitions but pain persisted. He expresses fatigue with sidelying ER and this is substantiated by visibly decreased AROM arc near end of set. Moderate capsular tightness/restriction is notable during PROM (R) UE this date as well as end range pain in planes of internal and external rotation.        Progress per Plan of Care         Manual Therapy:    15     mins  17998;  Therapeutic Exercise:    27     mins  53039;     Neuromuscular Rachid:        mins  56301;    Therapeutic Activity:     18     mins  41880;     Gait Training:           mins  95052;     Ultrasound:          mins  96309;    Electrical Stimulation:    15     mins  40975 ( );  Dry Needling          mins self-pay    Timed Treatment:   60   mins   Total Treatment:     83   mins    Krystle Aguilar PT, DPT  Physical Therapist  [place] License # [id number]    "

## 2017-03-24 ENCOUNTER — OFFICE VISIT (OUTPATIENT)
Dept: PHYSICAL THERAPY | Facility: CLINIC | Age: 57
End: 2017-03-24

## 2017-03-24 DIAGNOSIS — Z98.890 STATUS POST LABRAL REPAIR OF SHOULDER: ICD-10-CM

## 2017-03-24 DIAGNOSIS — Z98.890 S/P ARTHROSCOPY OF SHOULDER: Primary | ICD-10-CM

## 2017-03-24 DIAGNOSIS — M75.101 TEAR OF RIGHT ROTATOR CUFF, UNSPECIFIED TEAR EXTENT: ICD-10-CM

## 2017-03-24 PROCEDURE — 97140 MANUAL THERAPY 1/> REGIONS: CPT | Performed by: PHYSICAL THERAPIST

## 2017-03-24 PROCEDURE — 97530 THERAPEUTIC ACTIVITIES: CPT | Performed by: PHYSICAL THERAPIST

## 2017-03-24 PROCEDURE — 97110 THERAPEUTIC EXERCISES: CPT | Performed by: PHYSICAL THERAPIST

## 2017-03-24 NOTE — PROGRESS NOTES
Physical Therapy Daily Progress Note    Time In 1000  Time Out 1150    Pop Franco reports: right shoulder pain with some exercise/activity(increased ROM and duration/repetition increase complaints when patient asked to clarify statement).      Subjective     Objective   See Exercise, Manual, and Modality Logs for complete treatment.   Encouraged use of frequent ice and medication to help with pain control to allow for improved exercise performance/capability.  Posture/Postural awareness of scapula with exercise.  Added sleeper stretch IR/ER R sh  Discussed performance of supine ROM and stretching right shoulder with cane.      Assessment/Plan  Pt reported decreased discomfort with improved awareness of counting of reps/sets with therapeutic resistive band performance as well as lengthy discussion regarding performance in pain free range.  Lessened complaints with sidelying ER versus resistive band activities.  Benefits from increased stretching and mobilization of right shoulder to improve mobility.  GHJ mobility remains limited in IR plane, should benefit from sleeper stretch inclusion.    Progress strengthening /stabilization /functional activity as symptoms allow. Continue with manual efforts to improve overall ROM and GHJ mobility gladis in IR plane.           Manual Therapy:  25      mins  51745;  Therapeutic Exercise:     35    mins  93926;     Neuromuscular Rachid:       mins  43739;    Therapeutic Activity:    15      mins  92398;     Gait Training:         mins  97976;     Ultrasound:          mins  32531;    Electrical Stimulation:   15     mins  46383 ( );      Timed Treatment:   90   mins   Total Treatment:    110   mins    Darrion Gomez PTA    Physical Therapist Assistant [place] [id number]

## 2017-03-27 ENCOUNTER — OFFICE VISIT (OUTPATIENT)
Dept: PHYSICAL THERAPY | Facility: CLINIC | Age: 57
End: 2017-03-27

## 2017-03-27 DIAGNOSIS — M75.101 TEAR OF RIGHT ROTATOR CUFF, UNSPECIFIED TEAR EXTENT: ICD-10-CM

## 2017-03-27 DIAGNOSIS — Z98.890 S/P ARTHROSCOPY OF SHOULDER: Primary | ICD-10-CM

## 2017-03-27 DIAGNOSIS — Z98.890 STATUS POST LABRAL REPAIR OF SHOULDER: ICD-10-CM

## 2017-03-27 PROCEDURE — 97110 THERAPEUTIC EXERCISES: CPT | Performed by: PHYSICAL THERAPIST

## 2017-03-27 PROCEDURE — 97530 THERAPEUTIC ACTIVITIES: CPT | Performed by: PHYSICAL THERAPIST

## 2017-03-27 PROCEDURE — 97140 MANUAL THERAPY 1/> REGIONS: CPT | Performed by: PHYSICAL THERAPIST

## 2017-03-27 NOTE — PROGRESS NOTES
Physical Therapy Daily Progress Note    Time In 1100  Time Out 1230    Pop Franco reports: got some sleep over weekend.  Right shoulder didn't feel any worse after last therapy session.    Subjective     Objective   See Exercise, Manual, and Modality Logs for complete treatment.   Increased weight with sidelying ER, added doorway pec stretch, supine alphabet  Increased resistive band with exercise regimen  Verbal/Tactile cues to ensure proper exercise performance, scapular positioning with exercise and technique.        Assessment/Plan  Pt continues to benefit from manual mobilization techniques to improve glenohumeral joint mobility R shoulder.  Pt still exhibits guarding with stretching in IR/ER plane. Noted right pectoral tightness and limitation with active/passive stretching.  Patient encouraged to focus on ROM and stretching activities at home with exercise performance to maximize available joint mobility. Benefits from verbal and tactile to ensure proper exercise rationale, technique and progression.      Progress strengthening /stabilization /functional activity RUE to increase strength and continue with manual mobilization efforts to improve GH jt ROM.           Manual Therapy:   25     mins  28645;  Therapeutic Exercise:    30   mins  37128;     Neuromuscular Rachid:        mins  33917;    Therapeutic Activity:    20    mins  20308;     Gait Training:         mins  61515;     Ultrasound:         mins  94528;    Electrical Stimulation:    15     mins  62402 ( );      Timed Treatment: 80   mins   Total Treatment:    90   mins    Darrion Gomez, PTA    Physical Therapist Assistant [place] [id number]

## 2017-03-29 ENCOUNTER — TREATMENT (OUTPATIENT)
Dept: PHYSICAL THERAPY | Facility: CLINIC | Age: 57
End: 2017-03-29

## 2017-03-29 DIAGNOSIS — Z98.890 S/P ARTHROSCOPY OF SHOULDER: Primary | ICD-10-CM

## 2017-03-29 DIAGNOSIS — Z98.890 STATUS POST LABRAL REPAIR OF SHOULDER: ICD-10-CM

## 2017-03-29 PROCEDURE — 97110 THERAPEUTIC EXERCISES: CPT | Performed by: PHYSICAL THERAPIST

## 2017-03-29 PROCEDURE — 97014 ELECTRIC STIMULATION THERAPY: CPT | Performed by: PHYSICAL THERAPIST

## 2017-03-29 PROCEDURE — 97530 THERAPEUTIC ACTIVITIES: CPT | Performed by: PHYSICAL THERAPIST

## 2017-03-29 PROCEDURE — 97140 MANUAL THERAPY 1/> REGIONS: CPT | Performed by: PHYSICAL THERAPIST

## 2017-03-29 NOTE — PROGRESS NOTES
"Physical Therapy Daily Progress Note    Time In 1024  Time Out 1156    Pop Franco reports: \"My shoulder's feeling all right I guess.  It's not hurting too bad I guess. It might be feeling a little bit stronger.\"    Subjective     Objective   See Exercise, Manual, and Modality Logs for complete treatment.   *Increased bent over row to 3# weight  *Increased repetitions of sleeper stretch  *Added 2# weight to supine ABC    Assessment & Plan     Assessment  Assessment details: Patient requires cueing for decreased (R) upper trap activation during bent over rows to decrease upper trap activation (pt. c/o feeling soreness and fatigue in his (R) cervical region during this activity).  He performs all exercise/activity progressions with no c/o other than fatigue.  Good PROM (R) shoulder is available this date with mild end range limitations due to soft tissue restriction and discomfort, though internal rotation is moderately limited with a tighter capsular end feel.        Progress strengthening /stabilization /functional activity         Manual Therapy:    17     mins  02913;  Therapeutic Exercise:    22     mins  39337;     Neuromuscular Rachid:        mins  58832;    Therapeutic Activity:     12     mins  21932;     Gait Training:           mins  14408;     Ultrasound:          mins  20107;    Electrical Stimulation:    15     mins  10451 ( );  Dry Needling          mins self-pay    Timed Treatment:   51   mins   Total Treatment:     92   mins    Krystle Aguilar PT, DPT  Physical Therapist  [place] License # [id number]    "

## 2017-03-31 ENCOUNTER — OFFICE VISIT (OUTPATIENT)
Dept: PHYSICAL THERAPY | Facility: CLINIC | Age: 57
End: 2017-03-31

## 2017-03-31 DIAGNOSIS — M75.101 TEAR OF RIGHT ROTATOR CUFF, UNSPECIFIED TEAR EXTENT: ICD-10-CM

## 2017-03-31 DIAGNOSIS — Z98.890 S/P ARTHROSCOPY OF SHOULDER: Primary | ICD-10-CM

## 2017-03-31 DIAGNOSIS — Z98.890 STATUS POST LABRAL REPAIR OF SHOULDER: ICD-10-CM

## 2017-03-31 PROCEDURE — 97110 THERAPEUTIC EXERCISES: CPT | Performed by: PHYSICAL THERAPIST

## 2017-03-31 PROCEDURE — 97140 MANUAL THERAPY 1/> REGIONS: CPT | Performed by: PHYSICAL THERAPIST

## 2017-03-31 NOTE — PROGRESS NOTES
Physical Therapy Daily Progress Note    Time In 1010  Time Out 1200    Pop Franco reports: no new complaints regarding right shoulder per patient report this session.    Subjective     Objective     Active Range of Motion     Right Shoulder   Flexion: 160 (supine) degrees   Abduction: 132 (supine; self limited due to pain/discomfort) degrees   External rotation 90°: 58 degrees  Internal rotation 90°: 35 degrees     Passive Range of Motion     Right Shoulder   External rotation 90°: 74 (guarding/tight) degrees   Internal rotation 90°: 50 (tight, painful/guarding) degrees      See Exercise, Manual, and Modality Logs for complete treatment.   Encouraged regarding continuation of ROM and stretches at home to maximize range and improve joint mobility  Discussed importance and reasoning behind stretching, self mobilization and ROM activities.      Assessment/Plan  Continues to exhibit GH tightness/decreased joint mobility right UE especially in IR plane.  Exhibits decreased capability for achieving good stretch with cane assisted IR exercise.  Benefits from extended manual techniques regarding joint mobilization and stretching as well as verbal education regarding home exercise compliance.  Active/PROM improved versus last measurement right shoulder, though deficits remain present.    Progress per Plan of Care toward all goals           Manual Therapy:    25    mins  05760;  Therapeutic Exercise:  40    mins  90856;     Neuromuscular Rachid:       mins  00308;    Therapeutic Activity:     5    mins  77807;     Gait Training:          mins  40178;     Ultrasound:        mins  15723;    Electrical Stimulation:   15     mins  84199 ( );      Timed Treatment:  80  mins   Total Treatment:   110   mins    Darrion Gomez PTA    Physical Therapist Assistant [place] [id number]

## 2017-04-03 ENCOUNTER — TREATMENT (OUTPATIENT)
Dept: PHYSICAL THERAPY | Facility: CLINIC | Age: 57
End: 2017-04-03

## 2017-04-03 DIAGNOSIS — Z98.890 STATUS POST LABRAL REPAIR OF SHOULDER: Primary | ICD-10-CM

## 2017-04-03 DIAGNOSIS — Z98.890 S/P ARTHROSCOPY OF SHOULDER: ICD-10-CM

## 2017-04-03 DIAGNOSIS — M25.511 ACUTE PAIN OF RIGHT SHOULDER: ICD-10-CM

## 2017-04-03 PROCEDURE — 97140 MANUAL THERAPY 1/> REGIONS: CPT | Performed by: PHYSICAL THERAPIST

## 2017-04-03 PROCEDURE — 97530 THERAPEUTIC ACTIVITIES: CPT | Performed by: PHYSICAL THERAPIST

## 2017-04-03 PROCEDURE — 97110 THERAPEUTIC EXERCISES: CPT | Performed by: PHYSICAL THERAPIST

## 2017-04-03 NOTE — PROGRESS NOTES
"Physical Therapy Daily Progress Note    Time In 1105  Time Out 1227    Pop Franco reports: \"I am having trouble with that stretch in the doorway when my arms are [shoulder level] or higher.  It still hurts going out to the side too but I guess that's getting a little bit easier.  I'm sleeping better and my shoulder really doesn't wake me up very often or for very long.\"    Subjective     Objective   See Exercise, Manual, and Modality Logs for complete treatment.       Assessment & Plan     Assessment  Assessment details: Patient continues to require cueing to count and complete tasks as well as intermittently for exercise recall.  He performs all slowly but without significant c/o.  (R) shoulder PROM is mildly limited in planes of flexion, ER, and IR.  PROM (R) shoulder abduction is WFL without reported pain.  (R) shoulder AROM arcs are improving without significant upper trap or trunk compensation patterns, though reportedly with pain in plane of abduction.        Progress strengthening /stabilization /functional activity         Manual Therapy:    15     mins  64875;  Therapeutic Exercise:    31     mins  55879;     Neuromuscular Rachid:        mins  74471;    Therapeutic Activity:     12     mins  66189;     Gait Training:           mins  42152;     Ultrasound:          mins  02709;    Electrical Stimulation:    15     mins  28030 ( );  Dry Needling          mins self-pay    Timed Treatment:   58   mins   Total Treatment:     82   mins    Krystle Aguilar PT, DPT  Physical Therapist  [place] License # [id number]    "

## 2017-04-05 ENCOUNTER — TREATMENT (OUTPATIENT)
Dept: PHYSICAL THERAPY | Facility: CLINIC | Age: 57
End: 2017-04-05

## 2017-04-05 DIAGNOSIS — Z98.890 S/P ARTHROSCOPY OF SHOULDER: ICD-10-CM

## 2017-04-05 DIAGNOSIS — Z98.890 STATUS POST LABRAL REPAIR OF SHOULDER: Primary | ICD-10-CM

## 2017-04-05 DIAGNOSIS — M25.511 ACUTE PAIN OF RIGHT SHOULDER: ICD-10-CM

## 2017-04-05 PROCEDURE — 97014 ELECTRIC STIMULATION THERAPY: CPT | Performed by: PHYSICAL THERAPIST

## 2017-04-05 PROCEDURE — 97530 THERAPEUTIC ACTIVITIES: CPT | Performed by: PHYSICAL THERAPIST

## 2017-04-05 PROCEDURE — 97110 THERAPEUTIC EXERCISES: CPT | Performed by: PHYSICAL THERAPIST

## 2017-04-05 NOTE — PROGRESS NOTES
"Physical Therapy Daily Progress Note    Time In 1107  Time Out 1214    Pop Franco reports: \"My shoulder is ok.  I still have trouble with the doorway stretches.  I guess it's getting a little better in some ways.\"    Subjective     Objective   See Exercise, Manual, and Modality Logs for complete treatment.   *Increased repetitions with seated RTC 3-way AROM  *Added ball wall circles CW, CCW    Assessment & Plan     Assessment  Assessment details: Patient experiences apparent significant fatigue (with early onset thereof) during ball wall stabilization activity as initiation of closed chain (R) UE stabilization and strengthening.  (R) UE AROM is demonstrated as WFL in planes of flexion, scaption, and abduction with effort.  He demonstrates improved recall of sleeper stretch for shoulder IR this date but requires cueing for proper positioning and technique.          Progress per Plan of Care         Manual Therapy:         mins  25675;  Therapeutic Exercise:    34     mins  43249;     Neuromuscular Rachid:        mins  61032;    Therapeutic Activity:     14    mins  10728;     Gait Training:           mins  23397;     Ultrasound:          mins  50563;    Electrical Stimulation:    15     mins  64181 ( );  Dry Needling          mins self-pay    Timed Treatment:   48   mins   Total Treatment:     67   mins    Krystle Aguilar PT, DPT  Physical Therapist  [place] License # [id number]    "

## 2017-04-10 ENCOUNTER — TREATMENT (OUTPATIENT)
Dept: PHYSICAL THERAPY | Facility: CLINIC | Age: 57
End: 2017-04-10

## 2017-04-10 DIAGNOSIS — Z98.890 S/P ARTHROSCOPY OF SHOULDER: ICD-10-CM

## 2017-04-10 DIAGNOSIS — M25.511 ACUTE PAIN OF RIGHT SHOULDER: ICD-10-CM

## 2017-04-10 DIAGNOSIS — Z98.890 STATUS POST LABRAL REPAIR OF SHOULDER: Primary | ICD-10-CM

## 2017-04-10 PROCEDURE — 97140 MANUAL THERAPY 1/> REGIONS: CPT | Performed by: PHYSICAL THERAPIST

## 2017-04-10 PROCEDURE — 97530 THERAPEUTIC ACTIVITIES: CPT | Performed by: PHYSICAL THERAPIST

## 2017-04-10 PROCEDURE — 97110 THERAPEUTIC EXERCISES: CPT | Performed by: PHYSICAL THERAPIST

## 2017-04-10 PROCEDURE — 97014 ELECTRIC STIMULATION THERAPY: CPT | Performed by: PHYSICAL THERAPIST

## 2017-04-10 NOTE — PROGRESS NOTES
"Physical Therapy Daily Progress Note    Time In 1106  Time Out 1208    Pop Franco reports: \"I guess my shoulder is getting better.  It's not hurting me as much but it doesn't feel too strong yet.  It's not waking me up when I sleep anymore.  It's getting a little easier using it during the day. The doorway stretches and behind the back stretches still hurt a lot.\"    Subjective Evaluation    Pain  Current pain ratin (during shld IR stretching)  At best pain ratin  At worst pain ratin (during doorway pec stretches)           Objective     Active Range of Motion     Right Shoulder   Flexion: 154 degrees   Extension: 56 degrees   Abduction: 171 degrees   External rotation 90°: 84 degrees  Internal rotation 90°: 31 degrees     Passive Range of Motion     Right Shoulder   Flexion: 169 degrees   Abduction: 175 degrees   External rotation 90°: 74 degrees   Internal rotation 90°: 71 degrees     Strength/Myotome Testing     Right Shoulder     Planes of Motion   Right shoulder forward flexion strength: 5-/5.   Extension: 5   Right shoulder abduction strength: 5-/5.   Right shoulder external rotation strength at 0 degrees: 5-/5.   Internal rotation at 0°: 5      See Exercise, Manual, and Modality Logs for complete treatment.   *Increased UBE resistance to L2.5  (Resisted band activities are deferred this date due to time spent in reassessment for POC update)    Assessment & Plan     Assessment  Other impairment: Inabiltiy to tolerate job-specific tasks such as lifting and carrying activities.  Assessment details: Patient has been cooperative with PT efforts and verbalizes HEP compliance. He reports (R) shoulder pain is 0/10 most of the time but increased to between 5-8/10 with certain activities (i.e. pec stretching and IR stretching). He demonstrates moderately improved (R) shoulder PROM, AROM, and strength.  Mild deficits persist, however, in each of these areas.  We have not yet initiated work simulation " tasks to include lifting, carrying, push/pull, etc.  Patient has met STG #1, 4, and 5; partially met STG #2; and not met STG #3.  He has met LTG #3; partially met LTG #1; and remainder are not met/ongoing.      SHORT TERM GOALS: 2 weeks  1. Pt I w/ initial HEP -  MET  2. Pt to demonstrate PROM of the right shoulder to WFL to improve ability to perform ADL's - PARTIALLY MET  3. Pt to demonstrate ability to perform 30 minutes continuous activity in the clinic without increase in (R) shoulder pain - NOT MET  4. Tolerate 10 reps each of 5-way shoulder isometrics in neutral without increased s/s - MET  5. Report ability to sleep 4-5 hours uninterrupted by pain -  MET    LONG TERM GOALS: 4 weeks  1. Pt to demonstrate AROM of the right shoulder to WNL to allow ability to perform all necessary functional activities - PARTIALLY MET  2. Pt to demonstrate ability to lift 40# floor-->waist-->shoulder and 15-20# OH without increase in pain - NOT MET  3. Pt to report normalized driving ability, painfree - MET  4. Pt to tolerate 60 minutes continuous activity in the clinic without increase in pain - NOT MET  5. Report 75-80% return to normal functional ability - NOT MET  6. Demo readiness to RTW without restrictions - NOT MET         Other - see POC updated this date.          Manual Therapy:    12     mins  07482;  Therapeutic Exercise:    21     mins  16920;     Neuromuscular Rachid:        mins  35738;    Therapeutic Activity:     14     mins  96957;     Gait Training:           mins  61239;     Ultrasound:          mins  61850;    Electrical Stimulation:    15     mins  29947 ( );  Dry Needling          mins self-pay    Timed Treatment:   47   mins   Total Treatment:     62   mins    Krystle Aguilar PT, DPT  Physical Therapist  [place] License # [id number]

## 2017-04-12 ENCOUNTER — TREATMENT (OUTPATIENT)
Dept: PHYSICAL THERAPY | Facility: CLINIC | Age: 57
End: 2017-04-12

## 2017-04-12 DIAGNOSIS — Z98.890 STATUS POST LABRAL REPAIR OF SHOULDER: Primary | ICD-10-CM

## 2017-04-12 DIAGNOSIS — M25.511 ACUTE PAIN OF RIGHT SHOULDER: ICD-10-CM

## 2017-04-12 DIAGNOSIS — Z98.890 S/P ARTHROSCOPY OF SHOULDER: ICD-10-CM

## 2017-04-12 PROCEDURE — 97530 THERAPEUTIC ACTIVITIES: CPT | Performed by: PHYSICAL THERAPIST

## 2017-04-12 NOTE — PROGRESS NOTES
"17    To: Dr. Clayton      Physical Therapy Daily Progress Note    Time In 1306  Time Out 1344    Pop Franco reports: \"My shoulder feels fine when I'm not doing anything.  I still have pain when I raise my arm out to the side, behind my back, or do the stretches in the doorway.  I'm doing most [daily activities] without too much trouble but I really haven't tried lifting anything too heavy yet. It doesn't bother me that much to lay on my (R) side.  I guess I'm about 50-60% back to normal. I quit taking the pills the doctor gave me several days ago because I want to see how well I'm really doing or not before I see him. 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 That's what I had trouble with last time before I had the surgery and I tried to go back to work.\"     Subjective Evaluation    Pain  Pain scale: 2-3/10 (R) shoulder with PT activities.  At best pain ratin  At worst pain ratin  Location: (R) anterior shoulder, superior lateral upper arm         Objective     Tenderness     Additional Tenderness Details  Mild (+) TTP (R) GH joint line    Active Range of Motion     Right Shoulder   Flexion: 154 degrees   Extension: 31 degrees   Abduction: 148 (\"pain going up and coming down\") degrees with pain  External rotation 90°: 87 degrees  Internal rotation 90°: 34 degrees     Strength/Myotome Testing     Right Shoulder     Planes of Motion   Right shoulder forward flexion strength: 5-/5.   Right shoulder extension strength: 5-/5.   Abduction: 4+ (with mild pain)   External rotation at 0°: 4+ (with mild pain)   Right shoulder internal rotation strength at 0 degrees: 5-/5.     Right Elbow   Flexion: 4+  Extension strength: 5-/5.    Right Wrist/Hand      (2nd hand position)     Trial 1: 75    Trial 2: 68    Trial 3: 63    Average: 68.67     See Exercise, Manual, and Modality Logs for complete treatment.       Assessment & Plan "     Assessment  Assessment details: Patient demonstrates improving (R) shoulder AROM most planes (extension somewhat tight/limited this date) and improving strength of (R) shoulder over the past month. Moderate strength limitations persist in planes of (R) shoulder abduction and external rotation. Mild tenderness persists at (R) anterior shoulder. He is now able to lay on (R) side for short periods without significant pain.  He performs most PT activities with primarily c/o fatigue but does report pain with AROM abduction (pain subsides with increased repetitions), IR stretching, and doorway pec stretching.  He verbalizes compliance with HEP but requires moderate cueing for improved techniques to increase effectiveness.  Please advise after your exam as to continued PT to progress strengthening vs RTW status.        Other - to MD 04/13/17 with letter.         Manual Therapy:         mins  66641;  Therapeutic Exercise:         mins  22664;     Neuromuscular Rachid:        mins  53825;    Therapeutic Activity:     38     mins  96884;     Gait Training:           mins  45969;     Ultrasound:          mins  08127;    Electrical Stimulation:         mins  22450 ( );  Dry Needling          mins self-pay    Timed Treatment:   38   mins   Total Treatment:     38   mins    Krystle Aguilar PT, DPT  Physical Therapist  [place] License # [id number]

## 2017-04-17 ENCOUNTER — TREATMENT (OUTPATIENT)
Dept: PHYSICAL THERAPY | Facility: CLINIC | Age: 57
End: 2017-04-17

## 2017-04-17 DIAGNOSIS — Z98.890 STATUS POST LABRAL REPAIR OF SHOULDER: Primary | ICD-10-CM

## 2017-04-17 DIAGNOSIS — M25.511 ACUTE PAIN OF RIGHT SHOULDER: ICD-10-CM

## 2017-04-17 DIAGNOSIS — Z98.890 S/P ARTHROSCOPY OF SHOULDER: ICD-10-CM

## 2017-04-17 PROCEDURE — 97110 THERAPEUTIC EXERCISES: CPT | Performed by: PHYSICAL THERAPIST

## 2017-04-17 PROCEDURE — 97014 ELECTRIC STIMULATION THERAPY: CPT | Performed by: PHYSICAL THERAPIST

## 2017-04-17 PROCEDURE — 97530 THERAPEUTIC ACTIVITIES: CPT | Performed by: PHYSICAL THERAPIST

## 2017-04-17 NOTE — PROGRESS NOTES
"Physical Therapy Daily Progress Note    Time In 1307  Time Out 1429    Pop Franco reports: \"The doctor thinks I'm doing good I guess.  He thinks I'm about 70%.\"    Subjective     Objective   See Exercise, Manual, and Modality Logs for complete treatment.   *Added resisted shld ER vs green band  *Increased repetitions of bent over rows  *Increased weight with supine and sidelying exercises to 3#    Assessment & Plan     Assessment  Assessment details: Patient performs all activities for strengthening this date (new exercises as well as progressions) without c/o other than mild fatigue.  When performing (R) UE AROM at the same time as (L) UE, patient is able to achieve nearly full AROM arc with minimal compensation of upper trap; however, when performed unilaterally only, minimal/moderate compensation pattern is evident through \"hiking\" (R) UE using upper trap muscle.  Patient continues to require cueing for exercise recall, technique, and counting repetitions of most exercises.         Progress strengthening /stabilization /functional activity         Manual Therapy:         mins  69808;  Therapeutic Exercise:    23     mins  10728;     Neuromuscular Rachid:        mins  83186;    Therapeutic Activity:     13     mins  06339;     Gait Training:           mins  95564;     Ultrasound:          mins  39249;    Electrical Stimulation:    15     mins  15585 ( );  Dry Needling          mins self-pay    Timed Treatment:   51   mins   Total Treatment:     82   mins    Krystle Aguilar PT, DPT  Physical Therapist  [place] License # [id number]    "

## 2017-04-19 ENCOUNTER — TREATMENT (OUTPATIENT)
Dept: PHYSICAL THERAPY | Facility: CLINIC | Age: 57
End: 2017-04-19

## 2017-04-19 DIAGNOSIS — Z98.890 STATUS POST LABRAL REPAIR OF SHOULDER: Primary | ICD-10-CM

## 2017-04-19 DIAGNOSIS — Z98.890 S/P ARTHROSCOPY OF SHOULDER: ICD-10-CM

## 2017-04-19 DIAGNOSIS — M25.511 ACUTE PAIN OF RIGHT SHOULDER: ICD-10-CM

## 2017-04-19 PROCEDURE — 97140 MANUAL THERAPY 1/> REGIONS: CPT | Performed by: PHYSICAL THERAPIST

## 2017-04-19 PROCEDURE — 97530 THERAPEUTIC ACTIVITIES: CPT | Performed by: PHYSICAL THERAPIST

## 2017-04-19 PROCEDURE — 97110 THERAPEUTIC EXERCISES: CPT | Performed by: PHYSICAL THERAPIST

## 2017-04-19 PROCEDURE — 97014 ELECTRIC STIMULATION THERAPY: CPT | Performed by: PHYSICAL THERAPIST

## 2017-04-19 NOTE — PROGRESS NOTES
"Physical Therapy Daily Progress Note    Time In 1008  Time Out 1133    Pop Franco reports: \"My shoulder wasn't feeling too bad today.  It had a little nagging pain [while performing forward and retro UBE] but the other exercises mainly just make it feel tired.\"    Subjective     Objective   See Exercise, Manual, and Modality Logs for complete treatment.   *Progressed all resisted band activities to blue band    Assessment & Plan     Assessment  Assessment details: Patient appears several times to nearly fall asleep while standing up performing resisted band strengthening exercises.  He requires intermittent cueing for attention to task as well as frequent cueing for counting repetition of exercises with very poor return.  Quality of available (R) shoulder PROM is good all planes, with mild capsular restriction IR > flexion > ER > abduction.        Progress strengthening /stabilization /functional activity.          Manual Therapy:    12     mins  05150;  Therapeutic Exercise:    22     mins  95260;     Neuromuscular Rachid:        mins  92671;    Therapeutic Activity:     14     mins  62038;     Gait Training:           mins  87935;     Ultrasound:          mins  58973;    Electrical Stimulation:    15     mins  29326 ( );  Dry Needling          mins self-pay    Timed Treatment:   48   mins   Total Treatment:     85   mins    Krystle Aguilar PT, DPT  Physical Therapist  [place] License # [id number]    "

## 2017-04-21 ENCOUNTER — TREATMENT (OUTPATIENT)
Dept: PHYSICAL THERAPY | Facility: CLINIC | Age: 57
End: 2017-04-21

## 2017-04-21 DIAGNOSIS — Z98.890 S/P ARTHROSCOPY OF SHOULDER: ICD-10-CM

## 2017-04-21 DIAGNOSIS — M25.511 ACUTE PAIN OF RIGHT SHOULDER: ICD-10-CM

## 2017-04-21 DIAGNOSIS — Z98.890 STATUS POST LABRAL REPAIR OF SHOULDER: Primary | ICD-10-CM

## 2017-04-21 PROCEDURE — 97014 ELECTRIC STIMULATION THERAPY: CPT | Performed by: PHYSICAL THERAPIST

## 2017-04-21 PROCEDURE — 97110 THERAPEUTIC EXERCISES: CPT | Performed by: PHYSICAL THERAPIST

## 2017-04-21 PROCEDURE — 97530 THERAPEUTIC ACTIVITIES: CPT | Performed by: PHYSICAL THERAPIST

## 2017-04-21 NOTE — PROGRESS NOTES
"Physical Therapy Daily Progress Note    Time In 1007  Time Out 1116    Pop Franco reports: \"I'm doing okay today.  My shoulder just feels tired.\"    Subjective     Objective   See Exercise, Manual, and Modality Logs for complete treatment.       Assessment & Plan     Assessment  Assessment details: Patient is able to increase dumbbell weight activities to 4# with only minor c/o increased fatigue.  He continues to require cueing for attention to task as well as counting/repetitions of exercises in order to stop one activity and begin another.  Quality and ROM arcs of (R) shoulder AROM in planes of flexion, scaption, and abduction are good and not reportedly painful.  Postural cueing improves ROM arcs as well.        Progress strengthening /stabilization /functional activity - add ball wall circles in flexion and abduction.  Reassess AROM and strength (R) UE.         Manual Therapy:         mins  93484;  Therapeutic Exercise:    24     mins  56427;     Neuromuscular Rachid:        mins  75766;    Therapeutic Activity:     12     mins  14850;     Gait Training:           mins  85435;     Ultrasound:          mins  01501;    Electrical Stimulation:    15     mins  07430 ( );  Dry Needling          mins self-pay    Timed Treatment:   51   mins   Total Treatment:     69   mins    Krystle Aguilar PT, DPT  Physical Therapist  [place] License # [id number]    "

## 2017-04-24 ENCOUNTER — OFFICE VISIT (OUTPATIENT)
Dept: PHYSICAL THERAPY | Facility: CLINIC | Age: 57
End: 2017-04-24

## 2017-04-24 DIAGNOSIS — Z98.890 S/P ARTHROSCOPY OF SHOULDER: ICD-10-CM

## 2017-04-24 DIAGNOSIS — M75.101 TEAR OF RIGHT ROTATOR CUFF, UNSPECIFIED TEAR EXTENT: ICD-10-CM

## 2017-04-24 DIAGNOSIS — Z98.890 STATUS POST LABRAL REPAIR OF SHOULDER: Primary | ICD-10-CM

## 2017-04-24 PROCEDURE — 97530 THERAPEUTIC ACTIVITIES: CPT | Performed by: PHYSICAL THERAPIST

## 2017-04-24 PROCEDURE — 97140 MANUAL THERAPY 1/> REGIONS: CPT | Performed by: PHYSICAL THERAPIST

## 2017-04-24 PROCEDURE — 97110 THERAPEUTIC EXERCISES: CPT | Performed by: PHYSICAL THERAPIST

## 2017-04-24 NOTE — PROGRESS NOTES
"Physical Therapy Daily Progress Note    Time In 1006  Time Out 1200    Pop Franco reports: right shoulder feels like its \"popping\" more especially when moving arm out with the band.    Subjective     Objective     Active Range of Motion     Right Shoulder   Flexion: 160 degrees   Abduction: 145 degrees     Passive Range of Motion     Right Shoulder   Flexion: 175 degrees   Abduction: 175 degrees   External rotation 90°: 69 degrees   Internal rotation 90°: 55 degrees     Additional Passive Range of Motion Details  GHJt tightness persists IR/ER planes     See Exercise, Manual, and Modality Logs for complete treatment.   Emphasized importance of sleeper stretch to increase IR mobility as well as sidelying ER stretch in 90/90 positioning with/without cane.  Sidelying ER performed vs. Resistive ER RUE.      Assessment/Plan:  Pt continues to exhibit GH jt tightness right shoulder especially in IR/ER planes.  R shoulder mobility improving but still exhibits some functional deficits/limitations that affect ADL/functional activity performance.  Continues to benefit from verbal cues to ensure compliance of exercise regimen when away from PT to maximize strengthening efforts.  No noted discomfort/symptoms with isotonic ER performance RUE this session.    Progress strengthening /stabilization /functional activity for right shoulder/UE           Manual Therapy:    20     mins  85510;  Therapeutic Exercise:   28    mins  85098;     Neuromuscular Rachid:      mins  17963;    Therapeutic Activity:    15     mins  77371;     Gait Training:          mins  21856;     Ultrasound:          mins  15313;    Electrical Stimulation:    15    mins  04863 ( );      Timed Treatment:   78   mins   Total Treatment:    113   mins    Darrion Gomez PTA    Physical Therapist Assistant [place] [id number]    "

## 2017-04-26 ENCOUNTER — OFFICE VISIT (OUTPATIENT)
Dept: PHYSICAL THERAPY | Facility: CLINIC | Age: 57
End: 2017-04-26

## 2017-04-26 DIAGNOSIS — Z98.890 STATUS POST LABRAL REPAIR OF SHOULDER: Primary | ICD-10-CM

## 2017-04-26 DIAGNOSIS — Z98.890 S/P ARTHROSCOPY OF SHOULDER: ICD-10-CM

## 2017-04-26 DIAGNOSIS — M75.101 TEAR OF RIGHT ROTATOR CUFF, UNSPECIFIED TEAR EXTENT: ICD-10-CM

## 2017-04-26 PROCEDURE — 97110 THERAPEUTIC EXERCISES: CPT | Performed by: PHYSICAL THERAPIST

## 2017-04-26 PROCEDURE — 97530 THERAPEUTIC ACTIVITIES: CPT | Performed by: PHYSICAL THERAPIST

## 2017-04-26 PROCEDURE — 97140 MANUAL THERAPY 1/> REGIONS: CPT | Performed by: PHYSICAL THERAPIST

## 2017-04-26 NOTE — PROGRESS NOTES
Physical Therapy Daily Progress Note    Time In 1025  Time Out 1155    Pop Franco reports: experiencing some increased right shoulder pain/discomfort last couple of days.  States that he is out of pain meds, and could not sleep last night due to the shoulder pain.    Subjective     Objective     Active Range of Motion     Right Shoulder   Flexion: 170 (seated withh 1lb weight) degrees   Abduction: 160 (seated with 1lb weight) degrees      See Exercise, Manual, and Modality Logs for complete treatment.   Changed from supine to sidelying alphabet RUE with 2 lb weight x 3 sets  Increased seated 3 way RTC ROM to include 1 lb x 10 reps each direction.  Requires continued verbal/tactile cues to ensure proper exercise performance, technique, repetition.      Assessment/Plan  Patient remains cooperative with therapeutic exercise but unexpressive facially in regards to pain reports.  Very stoic.  Continues to require tactile/verbal queing in regards to exercise regimen to ensure proper performance and technique.  Benefits from direct 1 on 1 intervention to ensure proper repetitions as well as progression/performance with clinic exercise tasks.. No noted/reorted pain or discomfort during exercise performance this session, though pt verbalizes increased shoulder pain at home and night due to running out of pain meds. Continued G/H tightness noted with passive IR/ER. Demonstrates increased movement in seated shoulder AROM abduction and flexion planes versus last measurement.     Progress strengthening /stabilization /functional activity to include work related activities/simulation.  Add weighted pulleys to ex regimen.  Review use of home ice for pain control.           Manual Therapy:    13   mins  59615;  Therapeutic Exercise:   40    mins  63258;     Neuromuscular Rachid:     mins  31611;    Therapeutic Activity:   10    mins  25551;     Gait Training:         mins  27198;     Ultrasound:          mins  18053;     Electrical Stimulation:  15     mins  14121 ( );      Timed Treatment:   78   mins   Total Treatment:     90   mins    Darrion Gomez PTA    Physical Therapist Assistant [place] [id number]

## 2017-04-28 ENCOUNTER — OFFICE VISIT (OUTPATIENT)
Dept: PHYSICAL THERAPY | Facility: CLINIC | Age: 57
End: 2017-04-28

## 2017-04-28 DIAGNOSIS — Z98.890 STATUS POST LABRAL REPAIR OF SHOULDER: Primary | ICD-10-CM

## 2017-04-28 DIAGNOSIS — Z98.890 S/P ARTHROSCOPY OF SHOULDER: ICD-10-CM

## 2017-04-28 DIAGNOSIS — M75.101 TEAR OF RIGHT ROTATOR CUFF, UNSPECIFIED TEAR EXTENT: ICD-10-CM

## 2017-04-28 PROCEDURE — 97530 THERAPEUTIC ACTIVITIES: CPT | Performed by: PHYSICAL THERAPIST

## 2017-04-28 PROCEDURE — 97110 THERAPEUTIC EXERCISES: CPT | Performed by: PHYSICAL THERAPIST

## 2017-05-01 ENCOUNTER — OFFICE VISIT (OUTPATIENT)
Dept: PHYSICAL THERAPY | Facility: CLINIC | Age: 57
End: 2017-05-01

## 2017-05-01 DIAGNOSIS — Z98.890 STATUS POST LABRAL REPAIR OF SHOULDER: Primary | ICD-10-CM

## 2017-05-01 DIAGNOSIS — Z98.890 S/P ARTHROSCOPY OF SHOULDER: ICD-10-CM

## 2017-05-01 DIAGNOSIS — M75.101 TEAR OF RIGHT ROTATOR CUFF, UNSPECIFIED TEAR EXTENT: ICD-10-CM

## 2017-05-01 PROCEDURE — 97110 THERAPEUTIC EXERCISES: CPT | Performed by: PHYSICAL THERAPIST

## 2017-05-01 PROCEDURE — 97530 THERAPEUTIC ACTIVITIES: CPT | Performed by: PHYSICAL THERAPIST

## 2017-05-03 ENCOUNTER — OFFICE VISIT (OUTPATIENT)
Dept: PHYSICAL THERAPY | Facility: CLINIC | Age: 57
End: 2017-05-03

## 2017-05-03 DIAGNOSIS — Z98.890 S/P ARTHROSCOPY OF SHOULDER: ICD-10-CM

## 2017-05-03 DIAGNOSIS — Z98.890 STATUS POST LABRAL REPAIR OF SHOULDER: Primary | ICD-10-CM

## 2017-05-03 DIAGNOSIS — M75.101 TEAR OF RIGHT ROTATOR CUFF, UNSPECIFIED TEAR EXTENT: ICD-10-CM

## 2017-05-03 PROCEDURE — 97140 MANUAL THERAPY 1/> REGIONS: CPT | Performed by: PHYSICAL THERAPIST

## 2017-05-03 PROCEDURE — 97530 THERAPEUTIC ACTIVITIES: CPT | Performed by: PHYSICAL THERAPIST

## 2017-05-03 PROCEDURE — 97110 THERAPEUTIC EXERCISES: CPT | Performed by: PHYSICAL THERAPIST

## 2017-05-05 ENCOUNTER — TREATMENT (OUTPATIENT)
Dept: PHYSICAL THERAPY | Facility: CLINIC | Age: 57
End: 2017-05-05

## 2017-05-05 DIAGNOSIS — Z98.890 STATUS POST LABRAL REPAIR OF SHOULDER: Primary | ICD-10-CM

## 2017-05-05 DIAGNOSIS — M25.511 ACUTE PAIN OF RIGHT SHOULDER: ICD-10-CM

## 2017-05-05 DIAGNOSIS — Z98.890 S/P ARTHROSCOPY OF SHOULDER: ICD-10-CM

## 2017-05-05 PROCEDURE — 97530 THERAPEUTIC ACTIVITIES: CPT | Performed by: PHYSICAL THERAPIST

## 2017-05-05 PROCEDURE — 97140 MANUAL THERAPY 1/> REGIONS: CPT | Performed by: PHYSICAL THERAPIST

## 2017-05-05 PROCEDURE — 97110 THERAPEUTIC EXERCISES: CPT | Performed by: PHYSICAL THERAPIST

## 2017-05-08 ENCOUNTER — OFFICE VISIT (OUTPATIENT)
Dept: PHYSICAL THERAPY | Facility: CLINIC | Age: 57
End: 2017-05-08

## 2017-05-08 DIAGNOSIS — M25.511 ACUTE PAIN OF RIGHT SHOULDER: ICD-10-CM

## 2017-05-08 DIAGNOSIS — Z98.890 STATUS POST LABRAL REPAIR OF SHOULDER: Primary | ICD-10-CM

## 2017-05-08 DIAGNOSIS — M75.101 TEAR OF RIGHT ROTATOR CUFF, UNSPECIFIED TEAR EXTENT: ICD-10-CM

## 2017-05-08 PROCEDURE — 97014 ELECTRIC STIMULATION THERAPY: CPT | Performed by: PHYSICAL THERAPIST

## 2017-05-08 PROCEDURE — 97530 THERAPEUTIC ACTIVITIES: CPT | Performed by: PHYSICAL THERAPIST

## 2017-05-08 PROCEDURE — A9999 DME SUPPLY OR ACCESSORY, NOS: HCPCS | Performed by: PHYSICAL THERAPIST

## 2017-05-08 PROCEDURE — 97110 THERAPEUTIC EXERCISES: CPT | Performed by: PHYSICAL THERAPIST

## 2017-05-10 ENCOUNTER — OFFICE VISIT (OUTPATIENT)
Dept: PHYSICAL THERAPY | Facility: CLINIC | Age: 57
End: 2017-05-10

## 2017-05-10 DIAGNOSIS — Z98.890 STATUS POST LABRAL REPAIR OF SHOULDER: Primary | ICD-10-CM

## 2017-05-10 DIAGNOSIS — M25.511 ACUTE PAIN OF RIGHT SHOULDER: ICD-10-CM

## 2017-05-10 PROCEDURE — A9999 DME SUPPLY OR ACCESSORY, NOS: HCPCS | Performed by: PHYSICAL THERAPIST

## 2017-05-10 PROCEDURE — 97110 THERAPEUTIC EXERCISES: CPT | Performed by: PHYSICAL THERAPIST

## 2017-05-10 PROCEDURE — 97530 THERAPEUTIC ACTIVITIES: CPT | Performed by: PHYSICAL THERAPIST

## 2017-05-15 ENCOUNTER — TREATMENT (OUTPATIENT)
Dept: PHYSICAL THERAPY | Facility: CLINIC | Age: 57
End: 2017-05-15

## 2017-05-15 DIAGNOSIS — M25.511 ACUTE PAIN OF RIGHT SHOULDER: ICD-10-CM

## 2017-05-15 DIAGNOSIS — Z98.890 S/P ARTHROSCOPY OF SHOULDER: ICD-10-CM

## 2017-05-15 DIAGNOSIS — Z98.890 STATUS POST LABRAL REPAIR OF SHOULDER: Primary | ICD-10-CM

## 2017-05-15 PROCEDURE — 97530 THERAPEUTIC ACTIVITIES: CPT | Performed by: PHYSICAL THERAPIST

## 2017-05-15 PROCEDURE — 97110 THERAPEUTIC EXERCISES: CPT | Performed by: PHYSICAL THERAPIST

## 2017-05-17 ENCOUNTER — TREATMENT (OUTPATIENT)
Dept: PHYSICAL THERAPY | Facility: CLINIC | Age: 57
End: 2017-05-17

## 2017-05-17 DIAGNOSIS — M25.511 ACUTE PAIN OF RIGHT SHOULDER: ICD-10-CM

## 2017-05-17 DIAGNOSIS — Z98.890 STATUS POST LABRAL REPAIR OF SHOULDER: Primary | ICD-10-CM

## 2017-05-17 DIAGNOSIS — Z98.890 S/P ARTHROSCOPY OF SHOULDER: ICD-10-CM

## 2017-05-17 PROCEDURE — 97530 THERAPEUTIC ACTIVITIES: CPT | Performed by: PHYSICAL THERAPIST

## 2017-05-17 PROCEDURE — 97110 THERAPEUTIC EXERCISES: CPT | Performed by: PHYSICAL THERAPIST

## 2017-05-17 PROCEDURE — 97014 ELECTRIC STIMULATION THERAPY: CPT | Performed by: PHYSICAL THERAPIST

## 2017-05-19 ENCOUNTER — TREATMENT (OUTPATIENT)
Dept: PHYSICAL THERAPY | Facility: CLINIC | Age: 57
End: 2017-05-19

## 2017-05-19 DIAGNOSIS — Z98.890 S/P ARTHROSCOPY OF SHOULDER: ICD-10-CM

## 2017-05-19 DIAGNOSIS — Z98.890 STATUS POST LABRAL REPAIR OF SHOULDER: ICD-10-CM

## 2017-05-19 DIAGNOSIS — M25.511 ACUTE PAIN OF RIGHT SHOULDER: Primary | ICD-10-CM

## 2017-05-19 PROCEDURE — 97530 THERAPEUTIC ACTIVITIES: CPT | Performed by: PHYSICAL THERAPIST

## 2017-05-19 PROCEDURE — 97110 THERAPEUTIC EXERCISES: CPT | Performed by: PHYSICAL THERAPIST

## 2017-05-22 ENCOUNTER — TREATMENT (OUTPATIENT)
Dept: PHYSICAL THERAPY | Facility: CLINIC | Age: 57
End: 2017-05-22

## 2017-05-22 DIAGNOSIS — Z98.890 S/P ARTHROSCOPY OF SHOULDER: ICD-10-CM

## 2017-05-22 DIAGNOSIS — M25.511 ACUTE PAIN OF RIGHT SHOULDER: Primary | ICD-10-CM

## 2017-05-22 DIAGNOSIS — Z98.890 STATUS POST LABRAL REPAIR OF SHOULDER: ICD-10-CM

## 2017-05-22 PROCEDURE — 97110 THERAPEUTIC EXERCISES: CPT | Performed by: PHYSICAL THERAPIST

## 2017-05-22 PROCEDURE — 97530 THERAPEUTIC ACTIVITIES: CPT | Performed by: PHYSICAL THERAPIST

## 2017-05-24 ENCOUNTER — TREATMENT (OUTPATIENT)
Dept: PHYSICAL THERAPY | Facility: CLINIC | Age: 57
End: 2017-05-24

## 2017-05-24 DIAGNOSIS — Z98.890 STATUS POST LABRAL REPAIR OF SHOULDER: ICD-10-CM

## 2017-05-24 DIAGNOSIS — Z98.890 S/P ARTHROSCOPY OF SHOULDER: ICD-10-CM

## 2017-05-24 DIAGNOSIS — M25.511 ACUTE PAIN OF RIGHT SHOULDER: Primary | ICD-10-CM

## 2017-05-24 PROCEDURE — 97530 THERAPEUTIC ACTIVITIES: CPT | Performed by: PHYSICAL THERAPIST

## 2017-05-24 PROCEDURE — 97110 THERAPEUTIC EXERCISES: CPT | Performed by: PHYSICAL THERAPIST

## 2017-05-26 ENCOUNTER — TREATMENT (OUTPATIENT)
Dept: PHYSICAL THERAPY | Facility: CLINIC | Age: 57
End: 2017-05-26

## 2017-05-26 DIAGNOSIS — Z98.890 STATUS POST LABRAL REPAIR OF SHOULDER: ICD-10-CM

## 2017-05-26 DIAGNOSIS — Z98.890 S/P ARTHROSCOPY OF SHOULDER: ICD-10-CM

## 2017-05-26 DIAGNOSIS — M25.511 ACUTE PAIN OF RIGHT SHOULDER: Primary | ICD-10-CM

## 2017-05-26 PROCEDURE — 97110 THERAPEUTIC EXERCISES: CPT | Performed by: PHYSICAL THERAPIST

## 2017-05-26 PROCEDURE — 97530 THERAPEUTIC ACTIVITIES: CPT | Performed by: PHYSICAL THERAPIST

## 2017-05-30 ENCOUNTER — TREATMENT (OUTPATIENT)
Dept: PHYSICAL THERAPY | Facility: CLINIC | Age: 57
End: 2017-05-30

## 2017-05-30 DIAGNOSIS — Z98.890 S/P ARTHROSCOPY OF SHOULDER: ICD-10-CM

## 2017-05-30 DIAGNOSIS — M25.511 ACUTE PAIN OF RIGHT SHOULDER: Primary | ICD-10-CM

## 2017-05-30 DIAGNOSIS — Z98.890 STATUS POST LABRAL REPAIR OF SHOULDER: ICD-10-CM

## 2017-05-30 PROCEDURE — 97530 THERAPEUTIC ACTIVITIES: CPT | Performed by: PHYSICAL THERAPIST

## 2017-05-30 PROCEDURE — 97110 THERAPEUTIC EXERCISES: CPT | Performed by: PHYSICAL THERAPIST

## 2017-05-31 ENCOUNTER — TREATMENT (OUTPATIENT)
Dept: PHYSICAL THERAPY | Facility: CLINIC | Age: 57
End: 2017-05-31

## 2017-05-31 DIAGNOSIS — M25.511 ACUTE PAIN OF RIGHT SHOULDER: Primary | ICD-10-CM

## 2017-05-31 DIAGNOSIS — Z98.890 S/P ARTHROSCOPY OF SHOULDER: ICD-10-CM

## 2017-05-31 DIAGNOSIS — Z98.890 STATUS POST LABRAL REPAIR OF SHOULDER: ICD-10-CM

## 2017-05-31 PROCEDURE — 97530 THERAPEUTIC ACTIVITIES: CPT | Performed by: PHYSICAL THERAPIST

## 2017-05-31 PROCEDURE — 97110 THERAPEUTIC EXERCISES: CPT | Performed by: PHYSICAL THERAPIST

## 2017-06-02 ENCOUNTER — TREATMENT (OUTPATIENT)
Dept: PHYSICAL THERAPY | Facility: CLINIC | Age: 57
End: 2017-06-02

## 2017-06-02 DIAGNOSIS — Z98.890 STATUS POST LABRAL REPAIR OF SHOULDER: ICD-10-CM

## 2017-06-02 DIAGNOSIS — Z98.890 S/P ARTHROSCOPY OF SHOULDER: ICD-10-CM

## 2017-06-02 DIAGNOSIS — M25.511 ACUTE PAIN OF RIGHT SHOULDER: Primary | ICD-10-CM

## 2017-06-02 PROCEDURE — 97530 THERAPEUTIC ACTIVITIES: CPT | Performed by: PHYSICAL THERAPIST

## 2017-06-02 PROCEDURE — 97110 THERAPEUTIC EXERCISES: CPT | Performed by: PHYSICAL THERAPIST

## 2017-06-02 NOTE — PROGRESS NOTES
"Physical Therapy Daily Progress Note    Time In 1102  Time Out 1158    Pop Franco reports: \"My shoulder is feeling all right.\" When questioned, patient states that he cannot really tell whether his shoulder is getting any stronger and that he is not having any difficulty sleeping due to pain.    Subjective     Objective   See Exercise, Manual, and Modality Logs for complete treatment.   *Increased simulated cranking resistance to L 7.5      Assessment & Plan     Assessment  Assessment details: Patient is able to progress simulated cranking resistance as well as resume lifting activities (at 18# and 20# levels) without reports of increased fatigue compared to prior sessions, however patient takes several brief rest breaks and appears to be mildly to moderately fatigued.  Patient performs most standing activities with eyes closed except for lifting activities.          Progress strengthening /stabilization /functional activity.         Manual Therapy:         mins  37234;  Therapeutic Exercise:    24     mins  82408;     Neuromuscular Rachid:        mins  30509;    Therapeutic Activity:     32     mins  15825;     Gait Training:           mins  92394;     Ultrasound:          mins  89785;    Electrical Stimulation:         mins  97599 ( );  Dry Needling          mins self-pay    Timed Treatment:   56   mins   Total Treatment:     56   mins    Krystle Aguilar PT, DPT  Physical Therapist  [place] License # [id number]    "

## 2017-06-05 ENCOUNTER — TREATMENT (OUTPATIENT)
Dept: PHYSICAL THERAPY | Facility: CLINIC | Age: 57
End: 2017-06-05

## 2017-06-05 DIAGNOSIS — Z98.890 S/P ARTHROSCOPY OF SHOULDER: ICD-10-CM

## 2017-06-05 DIAGNOSIS — Z98.890 STATUS POST LABRAL REPAIR OF SHOULDER: ICD-10-CM

## 2017-06-05 DIAGNOSIS — M25.511 ACUTE PAIN OF RIGHT SHOULDER: Primary | ICD-10-CM

## 2017-06-05 PROCEDURE — 97530 THERAPEUTIC ACTIVITIES: CPT | Performed by: PHYSICAL THERAPIST

## 2017-06-05 PROCEDURE — 97110 THERAPEUTIC EXERCISES: CPT | Performed by: PHYSICAL THERAPIST

## 2017-06-05 NOTE — PROGRESS NOTES
"Physical Therapy Daily Progress Note    Time In 1106  Time Out 1208    Pop Franco reports: \"My shoulder is feeling alright.\" When questioned, patient responds that he does indeed have a doctor's appointment/follow-up this Thursday, June 8.    Subjective     Objective   See Exercise, Manual, and Modality Logs for complete treatment.   *Increased repetitions with ball wall circles  *Increased lifting weight (waist-->shoulder-->overhead) to 22#  *Increased simiulated cranking resistance to Lvl 8    Assessment & Plan     Assessment  Assessment details: Patient experiences pain from onset with simulated cranking in retro and clockwise directions as well as sleeper stretches (sidelying IR).  He is able to perform lifting activities painfree at 22# weight up to overhead level.  Remainder of exercises elicit mild onset of pain near end of reps/sets.  However, patient responds that he does not feel a great deal of muscular fatigue in his shoulder at conclusion of PT session.        Other - reassess for MD.         Manual Therapy:         mins  36538;  Therapeutic Exercise:    24     mins  21300;     Neuromuscular Rachid:        mins  43490;    Therapeutic Activity:     26     mins  02006;     Gait Training:           mins  29785;     Ultrasound:          mins  31166;    Electrical Stimulation:         mins  71105 ( );  Dry Needling          mins self-pay    Timed Treatment:   60   mins   Total Treatment:     62   mins    Krystle Aguilar PT, DPT  Physical Therapist  [place] License # [id number]    "

## 2017-06-07 ENCOUNTER — TREATMENT (OUTPATIENT)
Dept: PHYSICAL THERAPY | Facility: CLINIC | Age: 57
End: 2017-06-07

## 2017-06-07 DIAGNOSIS — M25.511 ACUTE PAIN OF RIGHT SHOULDER: Primary | ICD-10-CM

## 2017-06-07 DIAGNOSIS — Z98.890 S/P ARTHROSCOPY OF SHOULDER: ICD-10-CM

## 2017-06-07 DIAGNOSIS — Z98.890 STATUS POST LABRAL REPAIR OF SHOULDER: ICD-10-CM

## 2017-06-07 PROCEDURE — 97530 THERAPEUTIC ACTIVITIES: CPT | Performed by: PHYSICAL THERAPIST

## 2017-06-07 PROCEDURE — 97110 THERAPEUTIC EXERCISES: CPT | Performed by: PHYSICAL THERAPIST

## 2017-06-07 NOTE — PROGRESS NOTES
"Physical Therapy Progress Note    Time In 1109  Time Out 1204      2017      Re: Pop Franco  ________________________________________________________________    Mr. Pop Franco, has attended 44 PT sessions post-operatively for his (R) shoulder, 11 of which are since his most recent MD follow-up.  Treatment has consisted of: patient education, therapeutic activity, therapeutic exercise, manual therapy, and modalities.     S: Mr. Pop Franco states: \"My shoulder is getting a little better. I don't have any constant pain. It's sore in the morning when I wake up but once I start moving it, it loosens up and feels better. The things that were bothering me are getting better.  It's still a little sore reaching across my body.  Dribbling basketball bothers me a little bit. It bothers me pulling the handle toward me [simulated cranking]. I feel about 60% back to normal -- last month I thought I was 70% but I don't think I really was now that we have started doing new things in therapy. I have tried driving my car more with my right hand, the hand I normally drive with, and it still hurts me to turn.  I know I'm not ready to steer the truck or react in an emergency.  I also know I'm not ready to operate the fifth wheel or crank my trailer when I have to apply a lot of force.\"    Subjective Evaluation    Pain  Pain scale: 2-3/10 after performing arm bike with L5 resistance.  At best pain ratin  Pain scale at highest: 4-5/10.  Location: (R) anterior shoulder at GH joint line  Quality: sharp           Objective     Tenderness     Right Shoulder  Tenderness in the AC joint.     Active Range of Motion     Right Shoulder   Flexion: 155 degrees   Extension: 57 degrees   Abduction: 156 (\"sore\") degrees   External rotation 90°: 90 degrees  Internal rotation 90°: 37 degrees with pain  Internal rotation BTB: L3 with pain    Passive Range of Motion     Right Shoulder   Internal rotation 90°: 56 degrees with " pain    Strength/Myotome Testing     Right Shoulder     Planes of Motion   Flexion: 5   Extension: 5   Right shoulder abduction strength: 5-/5.   Right shoulder external rotation strength at 0 degrees: 5-/5.   Right shoulder internal rotation strength at 0 degrees: 5-/5.     Right Elbow   Flexion: 5  Extension: 5    Right Wrist/Hand      (2nd hand position)     Trial 1: 88    Trial 2: 82    Trial 3: 92    Average: 87.33     See Exercise, Manual, and Modality Logs for complete treatment.       Assessment & Plan     Assessment  Assessment details: Patient has been compliant with PT efforts over the past month, though significant lethargy and decreased alertness persists during each PT session, and exercise recall with utilization of proper technique is poor. He requires one-on-one instruction with frequent corrective cueing for exercise recall and technique. He is performing a fairly advanced (R) shoulder strengthening program along with simulation of trailer cranking.  He is able to lift up to 22# overhead without pain. He does report pain with most pulling activities of (R) UE almost immediately.  He demonstrates improving strength of (R) UE, though  measurements are inconsistent and slightly decreased this date compared to prior assessment. Despite heavy emphasis on glenohumeral joint internal rotation, there is not a significant improvement in measurements taken this date.  He is concerned about his ability to drive using (R) UE (specifically with turning activities and reacting in case of an emergency), operating his fifth wheel, and hooking/unhooking the trailer using the crank/mahsa.  Please advise with recommendations after your exam.        Other - to Dr. Clayton for follow-up appointment tomorrow, 06/08/17.         Manual Therapy:         mins  32933;  Therapeutic Exercise:    14     mins  73970;     Neuromuscular Rachid:        mins  32366;    Therapeutic Activity:     41     mins  04414;     Gait  Training:           mins  94282;     Ultrasound:          mins  35797;    Electrical Stimulation:         mins  23669 ( );  Dry Needling          mins self-pay    Timed Treatment:   55   mins   Total Treatment:     55   mins    Krystle Aguilar PT, DPT  Physical Therapist  [place] License # [id number]

## 2017-06-14 ENCOUNTER — TRANSCRIBE ORDERS (OUTPATIENT)
Dept: PHYSICAL THERAPY | Facility: CLINIC | Age: 57
End: 2017-06-14

## 2017-06-14 DIAGNOSIS — M25.511 RIGHT SHOULDER PAIN, UNSPECIFIED CHRONICITY: Primary | ICD-10-CM

## 2017-06-26 ENCOUNTER — TREATMENT (OUTPATIENT)
Dept: PHYSICAL THERAPY | Facility: CLINIC | Age: 57
End: 2017-06-26

## 2017-06-26 DIAGNOSIS — Z98.890 STATUS POST LABRAL REPAIR OF SHOULDER: Primary | ICD-10-CM

## 2017-06-26 PROCEDURE — 97750 PHYSICAL PERFORMANCE TEST: CPT | Performed by: PHYSICAL THERAPIST

## 2018-05-18 ENCOUNTER — TRANSCRIBE ORDERS (OUTPATIENT)
Dept: PHYSICAL THERAPY | Facility: CLINIC | Age: 58
End: 2018-05-18

## 2018-05-18 DIAGNOSIS — M25.511 RIGHT SHOULDER PAIN, UNSPECIFIED CHRONICITY: Primary | ICD-10-CM
